# Patient Record
Sex: FEMALE | Race: WHITE | NOT HISPANIC OR LATINO
[De-identification: names, ages, dates, MRNs, and addresses within clinical notes are randomized per-mention and may not be internally consistent; named-entity substitution may affect disease eponyms.]

---

## 2018-12-14 PROBLEM — Z00.00 ENCOUNTER FOR PREVENTIVE HEALTH EXAMINATION: Status: ACTIVE | Noted: 2018-12-14

## 2019-01-14 ENCOUNTER — RECORD ABSTRACTING (OUTPATIENT)
Age: 39
End: 2019-01-14

## 2019-02-05 ENCOUNTER — APPOINTMENT (OUTPATIENT)
Dept: OBGYN | Facility: CLINIC | Age: 39
End: 2019-02-05

## 2021-01-21 ENCOUNTER — TRANSCRIPTION ENCOUNTER (OUTPATIENT)
Age: 41
End: 2021-01-21

## 2021-04-07 ENCOUNTER — APPOINTMENT (OUTPATIENT)
Dept: THORACIC SURGERY | Facility: CLINIC | Age: 41
End: 2021-04-07
Payer: COMMERCIAL

## 2021-04-07 VITALS
DIASTOLIC BLOOD PRESSURE: 94 MMHG | SYSTOLIC BLOOD PRESSURE: 142 MMHG | OXYGEN SATURATION: 99 % | HEIGHT: 65 IN | RESPIRATION RATE: 17 BRPM | HEART RATE: 70 BPM | TEMPERATURE: 98 F | BODY MASS INDEX: 27.82 KG/M2 | WEIGHT: 167 LBS

## 2021-04-07 DIAGNOSIS — Z87.891 PERSONAL HISTORY OF NICOTINE DEPENDENCE: ICD-10-CM

## 2021-04-07 PROCEDURE — 99072 ADDL SUPL MATRL&STAF TM PHE: CPT

## 2021-04-07 PROCEDURE — 99205 OFFICE O/P NEW HI 60 MIN: CPT

## 2021-04-08 PROBLEM — Z87.891 FORMER SMOKER: Status: ACTIVE | Noted: 2021-04-08

## 2021-04-08 RX ORDER — PREDNISONE 10 MG/1
10 TABLET ORAL
Refills: 0 | Status: ACTIVE | COMMUNITY

## 2021-04-08 RX ORDER — PYRIDOSTIGMINE BROMIDE 60 MG/1
60 TABLET ORAL
Refills: 0 | Status: ACTIVE | COMMUNITY

## 2021-04-09 NOTE — ASSESSMENT
[FreeTextEntry1] : Ms. RAFITA DOBBS, 40 year old female, former smoker (4 years 1/2 ppd, quit 2006), with no significant past medical history who presented with diplopia and ptosis in 12/2020 soon after diagnosed with Myasthenia Gravis; further workup demonstrated mediastinal mass on Chest CT 2/2021.\par \par I have independently reviewed the patient's medical records and diagnostic images at time of this office consultation and I recommend that she undergo a Bilateral Robotic Assisted Total Thymectomy in about a month from now when her prednisone taper is at about 15-20 mg (she is currently on 25 mg). Risks, benefits, and alternatives explained to patient including possibility of open sternotomy , all questions answered, patient agreed to proceed with surgery. She was instructed that she will require PST and COVID testing prior to surgery.\par \par I personally performed the services described in the documentation, reviewed the documentation recorded by the scribe in my presence and it accurately and completely records my words and actions.\par \par I, Jenifer Hwang NP, am scribing for and in the presence of ALEXUS Naylor, the following sections HISTORY OF PRESENT ILLNESS, PAST MEDICAL/FAMILY/SOCIAL HISTORY; REVIEW OF SYSTEMS; VITAL SIGNS; PHYSICAL EXAM; DISPOSITION.\par  \par

## 2021-04-09 NOTE — PHYSICAL EXAM
[General Appearance - Alert] : alert [General Appearance - In No Acute Distress] : in no acute distress [Sclera] : the sclera and conjunctiva were normal [PERRL With Normal Accommodation] : pupils were equal in size, round, and reactive to light [Extraocular Movements] : extraocular movements were intact [Outer Ear] : the ears and nose were normal in appearance [Oropharynx] : the oropharynx was normal [Neck Appearance] : the appearance of the neck was normal [Neck Cervical Mass (___cm)] : no neck mass was observed [Jugular Venous Distention Increased] : there was no jugular-venous distention [Thyroid Diffuse Enlargement] : the thyroid was not enlarged [Thyroid Nodule] : there were no palpable thyroid nodules [Auscultation Breath Sounds / Voice Sounds] : lungs were clear to auscultation bilaterally [Heart Rate And Rhythm] : heart rate was normal and rhythm regular [Heart Sounds] : normal S1 and S2 [Heart Sounds Gallop] : no gallops [Murmurs] : no murmurs [Heart Sounds Pericardial Friction Rub] : no pericardial rub [Examination Of The Chest] : the chest was normal in appearance [Chest Visual Inspection Thoracic Asymmetry] : no chest asymmetry [Diminished Respiratory Excursion] : normal chest expansion [2+] : left 2+ [Bowel Sounds] : normal bowel sounds [Abdomen Soft] : soft [Abdomen Tenderness] : non-tender [Abdomen Mass (___ Cm)] : no abdominal mass palpated [Cervical Lymph Nodes Enlarged Posterior Bilaterally] : posterior cervical [Cervical Lymph Nodes Enlarged Anterior Bilaterally] : anterior cervical [Supraclavicular Lymph Nodes Enlarged Bilaterally] : supraclavicular [No CVA Tenderness] : no ~M costovertebral angle tenderness [No Spinal Tenderness] : no spinal tenderness [Abnormal Walk] : normal gait [Nail Clubbing] : no clubbing  or cyanosis of the fingernails [Musculoskeletal - Swelling] : no joint swelling seen [Motor Tone] : muscle strength and tone were normal [Skin Color & Pigmentation] : normal skin color and pigmentation [Skin Turgor] : normal skin turgor [] : no rash [Sensation] : the sensory exam was normal to light touch and pinprick [No Focal Deficits] : no focal deficits [Oriented To Time, Place, And Person] : oriented to person, place, and time [Affect] : the affect was normal [FreeTextEntry1] : Deferred

## 2021-04-09 NOTE — REVIEW OF SYSTEMS
[As Noted in HPI] : as noted in HPI [Limb Weakness] : limb weakness [Negative] : Heme/Lymph [FreeTextEntry3] : Eye fluttering

## 2021-04-09 NOTE — DATA REVIEWED
[FreeTextEntry1] : I have independently reviewed the CT Chest on 2/5/2021 ([facility]):\par - 2.4 x 3.5 x 3.5 cm Left sided paracardiac mass anterolateral to the main pulmonary trunk (2:57) \par - Calcified left subcarinal lymph node\par - LLL calcified granuloma\par

## 2021-04-09 NOTE — CONSULT LETTER
[Dear  ___] : Dear  [unfilled], [Consult Letter:] : I had the pleasure of evaluating your patient, [unfilled]. [( Thank you for referring [unfilled] for consultation for _____ )] : Thank you for referring [unfilled] for consultation for [unfilled] [Please see my note below.] : Please see my note below. [Consult Closing:] : Thank you very much for allowing me to participate in the care of this patient.  If you have any questions, please do not hesitate to contact me. [Sincerely,] : Sincerely, [DrValentina  ___] : Dr. HICKS [FreeTextEntry2] : Dr. Michael Alvarado (Neuro) [FreeTextEntry3] : Reza Callahan MD, FACS \par , Division of Thoracic Surgery \par [facility] \par Chief, Thoracic Surgery \par [facility] \par Department of Cardiovascular & Thoracic Surgery \par  \par [facility] [employer] [facility]\par

## 2021-05-07 ENCOUNTER — OUTPATIENT (OUTPATIENT)
Dept: OUTPATIENT SERVICES | Facility: HOSPITAL | Age: 41
LOS: 1 days | End: 2021-05-07

## 2021-05-07 VITALS
RESPIRATION RATE: 16 BRPM | DIASTOLIC BLOOD PRESSURE: 84 MMHG | HEART RATE: 67 BPM | OXYGEN SATURATION: 98 % | SYSTOLIC BLOOD PRESSURE: 130 MMHG | WEIGHT: 162.92 LBS | HEIGHT: 64.75 IN

## 2021-05-07 DIAGNOSIS — Z90.89 ACQUIRED ABSENCE OF OTHER ORGANS: Chronic | ICD-10-CM

## 2021-05-07 DIAGNOSIS — Z98.890 OTHER SPECIFIED POSTPROCEDURAL STATES: Chronic | ICD-10-CM

## 2021-05-07 DIAGNOSIS — J98.59 OTHER DISEASES OF MEDIASTINUM, NOT ELSEWHERE CLASSIFIED: ICD-10-CM

## 2021-05-07 LAB
ANION GAP SERPL CALC-SCNC: 12 MMOL/L — SIGNIFICANT CHANGE UP (ref 7–14)
BLD GP AB SCN SERPL QL: NEGATIVE — SIGNIFICANT CHANGE UP
BUN SERPL-MCNC: 15 MG/DL — SIGNIFICANT CHANGE UP (ref 7–23)
CALCIUM SERPL-MCNC: 9 MG/DL — SIGNIFICANT CHANGE UP (ref 8.4–10.5)
CHLORIDE SERPL-SCNC: 103 MMOL/L — SIGNIFICANT CHANGE UP (ref 98–107)
CO2 SERPL-SCNC: 24 MMOL/L — SIGNIFICANT CHANGE UP (ref 22–31)
CREAT SERPL-MCNC: 0.67 MG/DL — SIGNIFICANT CHANGE UP (ref 0.5–1.3)
GLUCOSE SERPL-MCNC: 80 MG/DL — SIGNIFICANT CHANGE UP (ref 70–99)
HCG UR QL: NEGATIVE — SIGNIFICANT CHANGE UP
HCT VFR BLD CALC: 41.3 % — SIGNIFICANT CHANGE UP (ref 34.5–45)
HGB BLD-MCNC: 13.6 G/DL — SIGNIFICANT CHANGE UP (ref 11.5–15.5)
MCHC RBC-ENTMCNC: 30.1 PG — SIGNIFICANT CHANGE UP (ref 27–34)
MCHC RBC-ENTMCNC: 32.9 GM/DL — SIGNIFICANT CHANGE UP (ref 32–36)
MCV RBC AUTO: 91.4 FL — SIGNIFICANT CHANGE UP (ref 80–100)
NRBC # BLD: 0 /100 WBCS — SIGNIFICANT CHANGE UP
NRBC # FLD: 0 K/UL — SIGNIFICANT CHANGE UP
PLATELET # BLD AUTO: 285 K/UL — SIGNIFICANT CHANGE UP (ref 150–400)
POTASSIUM SERPL-MCNC: 3.5 MMOL/L — SIGNIFICANT CHANGE UP (ref 3.5–5.3)
POTASSIUM SERPL-SCNC: 3.5 MMOL/L — SIGNIFICANT CHANGE UP (ref 3.5–5.3)
RBC # BLD: 4.52 M/UL — SIGNIFICANT CHANGE UP (ref 3.8–5.2)
RBC # FLD: 13.3 % — SIGNIFICANT CHANGE UP (ref 10.3–14.5)
RH IG SCN BLD-IMP: NEGATIVE — SIGNIFICANT CHANGE UP
SODIUM SERPL-SCNC: 139 MMOL/L — SIGNIFICANT CHANGE UP (ref 135–145)
WBC # BLD: 11.27 K/UL — HIGH (ref 3.8–10.5)
WBC # FLD AUTO: 11.27 K/UL — HIGH (ref 3.8–10.5)

## 2021-05-07 RX ORDER — SODIUM CHLORIDE 9 MG/ML
1000 INJECTION, SOLUTION INTRAVENOUS
Refills: 0 | Status: DISCONTINUED | OUTPATIENT
Start: 2021-05-25 | End: 2021-05-26

## 2021-05-07 NOTE — H&P PST ADULT - NSICDXPASTMEDICALHX_GEN_ALL_CORE_FT
PAST MEDICAL HISTORY:  Disorder of knee left knee - surgeries x3 with 2 screws    Mediastinal mass     Myasthenia gravis     Smoking history

## 2021-05-07 NOTE — H&P PST ADULT - HISTORY OF PRESENT ILLNESS
Pt had Pfizer COVID vaccine   Pt to have COVID preop test   Pt denies COVID  Pt is a 41 yr old female scheduled for Robotic Assisted B/L Total Thymectomy with Dr Callahan tentatively 5/25/21 - pt noted diplopia and ptosis 12/20 and Dx Myasthenia Gravis and found to have mediastinal mass - now for removal. Pt reports improvement with vision - on prednisone and denies weakness or SOB     Pt had Pfizer COVID vaccine   Pt to have COVID preop test   Pt denies COVID

## 2021-05-07 NOTE — H&P PST ADULT - NSICDXPASTSURGICALHX_GEN_ALL_CORE_FT
PAST SURGICAL HISTORY:  H/O bilateral breast reduction surgery     H/O knee surgery left - x3 with screws    History of tonsillectomy

## 2021-05-07 NOTE — H&P PST ADULT - NSICDXPROBLEM_GEN_ALL_CORE_FT
PROBLEM DIAGNOSES  Problem: Mediastinal mass  Assessment and Plan: Pt scheduled for surgery and preop instructions including instructions for taking Famotidine and for Chlorhexidine use in showering on the day of surgery, given verbally and with use of  written materials, and patient confirming understanding of such instructions using  teach back   method.  OR Booking notified of Left knee screws and copper IUD  Pt to take Mestinon and Prednisone am DOS

## 2021-05-07 NOTE — H&P PST ADULT - NSANTHOSAYNRD_GEN_A_CORE
No. MAMIE screening performed.  STOP BANG Legend: 0-2 = LOW Risk; 3-4 = INTERMEDIATE Risk; 5-8 = HIGH Risk

## 2021-05-22 ENCOUNTER — APPOINTMENT (OUTPATIENT)
Dept: DISASTER EMERGENCY | Facility: CLINIC | Age: 41
End: 2021-05-22

## 2021-05-22 DIAGNOSIS — Z01.818 ENCOUNTER FOR OTHER PREPROCEDURAL EXAMINATION: ICD-10-CM

## 2021-05-23 LAB — SARS-COV-2 N GENE NPH QL NAA+PROBE: NOT DETECTED

## 2021-05-24 NOTE — ASU PATIENT PROFILE, ADULT - PSH
H/O bilateral breast reduction surgery    H/O knee surgery  left - x3 with screws  History of tonsillectomy

## 2021-05-25 ENCOUNTER — APPOINTMENT (OUTPATIENT)
Dept: THORACIC SURGERY | Facility: HOSPITAL | Age: 41
End: 2021-05-25

## 2021-05-25 ENCOUNTER — RESULT REVIEW (OUTPATIENT)
Age: 41
End: 2021-05-25

## 2021-05-25 ENCOUNTER — INPATIENT (INPATIENT)
Facility: HOSPITAL | Age: 41
LOS: 0 days | Discharge: HOME | End: 2021-05-26
Attending: THORACIC SURGERY (CARDIOTHORACIC VASCULAR SURGERY) | Admitting: THORACIC SURGERY (CARDIOTHORACIC VASCULAR SURGERY)
Payer: COMMERCIAL

## 2021-05-25 VITALS
HEIGHT: 64.75 IN | OXYGEN SATURATION: 100 % | DIASTOLIC BLOOD PRESSURE: 92 MMHG | TEMPERATURE: 99 F | SYSTOLIC BLOOD PRESSURE: 137 MMHG | HEART RATE: 105 BPM | WEIGHT: 162.92 LBS | RESPIRATION RATE: 16 BRPM

## 2021-05-25 DIAGNOSIS — Z98.890 OTHER SPECIFIED POSTPROCEDURAL STATES: Chronic | ICD-10-CM

## 2021-05-25 DIAGNOSIS — J98.59 OTHER DISEASES OF MEDIASTINUM, NOT ELSEWHERE CLASSIFIED: ICD-10-CM

## 2021-05-25 DIAGNOSIS — Z90.89 ACQUIRED ABSENCE OF OTHER ORGANS: Chronic | ICD-10-CM

## 2021-05-25 LAB — HCG UR QL: NEGATIVE — SIGNIFICANT CHANGE UP

## 2021-05-25 PROCEDURE — 99233 SBSQ HOSP IP/OBS HIGH 50: CPT

## 2021-05-25 PROCEDURE — 71045 X-RAY EXAM CHEST 1 VIEW: CPT | Mod: 26

## 2021-05-25 PROCEDURE — 32662 THORACOSCOPY W/MEDIAST EXC: CPT

## 2021-05-25 PROCEDURE — 88307 TISSUE EXAM BY PATHOLOGIST: CPT | Mod: 26

## 2021-05-25 RX ORDER — HEPARIN SODIUM 5000 [USP'U]/ML
5000 INJECTION INTRAVENOUS; SUBCUTANEOUS ONCE
Refills: 0 | Status: COMPLETED | OUTPATIENT
Start: 2021-05-25 | End: 2021-05-25

## 2021-05-25 RX ORDER — NALOXONE HYDROCHLORIDE 4 MG/.1ML
0.1 SPRAY NASAL
Refills: 0 | Status: DISCONTINUED | OUTPATIENT
Start: 2021-05-25 | End: 2021-05-26

## 2021-05-25 RX ORDER — GABAPENTIN 400 MG/1
300 CAPSULE ORAL ONCE
Refills: 0 | Status: COMPLETED | OUTPATIENT
Start: 2021-05-25 | End: 2021-05-25

## 2021-05-25 RX ORDER — PYRIDOSTIGMINE BROMIDE 60 MG/5ML
1 SOLUTION ORAL
Qty: 0 | Refills: 0 | DISCHARGE

## 2021-05-25 RX ORDER — ACETAMINOPHEN 500 MG
1000 TABLET ORAL ONCE
Refills: 0 | Status: DISCONTINUED | OUTPATIENT
Start: 2021-05-25 | End: 2021-05-26

## 2021-05-25 RX ORDER — HYDROMORPHONE HYDROCHLORIDE 2 MG/ML
0.5 INJECTION INTRAMUSCULAR; INTRAVENOUS; SUBCUTANEOUS
Refills: 0 | Status: DISCONTINUED | OUTPATIENT
Start: 2021-05-25 | End: 2021-05-26

## 2021-05-25 RX ORDER — FAMOTIDINE 10 MG/ML
20 INJECTION INTRAVENOUS
Refills: 0 | Status: DISCONTINUED | OUTPATIENT
Start: 2021-05-25 | End: 2021-05-26

## 2021-05-25 RX ORDER — ACETAMINOPHEN 500 MG
975 TABLET ORAL ONCE
Refills: 0 | Status: COMPLETED | OUTPATIENT
Start: 2021-05-25 | End: 2021-05-25

## 2021-05-25 RX ORDER — PYRIDOSTIGMINE BROMIDE 60 MG/5ML
60 SOLUTION ORAL THREE TIMES A DAY
Refills: 0 | Status: DISCONTINUED | OUTPATIENT
Start: 2021-05-25 | End: 2021-05-26

## 2021-05-25 RX ORDER — SODIUM CHLORIDE 9 MG/ML
4 INJECTION INTRAMUSCULAR; INTRAVENOUS; SUBCUTANEOUS EVERY 8 HOURS
Refills: 0 | Status: DISCONTINUED | OUTPATIENT
Start: 2021-05-25 | End: 2021-05-25

## 2021-05-25 RX ORDER — HEPARIN SODIUM 5000 [USP'U]/ML
5000 INJECTION INTRAVENOUS; SUBCUTANEOUS EVERY 8 HOURS
Refills: 0 | Status: DISCONTINUED | OUTPATIENT
Start: 2021-05-25 | End: 2021-05-26

## 2021-05-25 RX ORDER — HYDROMORPHONE HYDROCHLORIDE 2 MG/ML
30 INJECTION INTRAMUSCULAR; INTRAVENOUS; SUBCUTANEOUS
Refills: 0 | Status: DISCONTINUED | OUTPATIENT
Start: 2021-05-25 | End: 2021-05-26

## 2021-05-25 RX ORDER — ACETAMINOPHEN 500 MG
1000 TABLET ORAL ONCE
Refills: 0 | Status: DISCONTINUED | OUTPATIENT
Start: 2021-05-26 | End: 2021-05-26

## 2021-05-25 RX ORDER — METOPROLOL TARTRATE 50 MG
12.5 TABLET ORAL EVERY 8 HOURS
Refills: 0 | Status: DISCONTINUED | OUTPATIENT
Start: 2021-05-25 | End: 2021-05-25

## 2021-05-25 RX ORDER — DIPHENHYDRAMINE HCL 50 MG
25 CAPSULE ORAL EVERY 4 HOURS
Refills: 0 | Status: DISCONTINUED | OUTPATIENT
Start: 2021-05-25 | End: 2021-05-26

## 2021-05-25 RX ORDER — ONDANSETRON 8 MG/1
4 TABLET, FILM COATED ORAL EVERY 6 HOURS
Refills: 0 | Status: DISCONTINUED | OUTPATIENT
Start: 2021-05-25 | End: 2021-05-26

## 2021-05-25 RX ADMIN — Medication 975 MILLIGRAM(S): at 07:39

## 2021-05-25 RX ADMIN — HYDROMORPHONE HYDROCHLORIDE 30 MILLILITER(S): 2 INJECTION INTRAMUSCULAR; INTRAVENOUS; SUBCUTANEOUS at 21:04

## 2021-05-25 RX ADMIN — PYRIDOSTIGMINE BROMIDE 60 MILLIGRAM(S): 60 SOLUTION ORAL at 14:00

## 2021-05-25 RX ADMIN — PYRIDOSTIGMINE BROMIDE 60 MILLIGRAM(S): 60 SOLUTION ORAL at 22:59

## 2021-05-25 RX ADMIN — Medication 5 MILLIGRAM(S): at 22:59

## 2021-05-25 RX ADMIN — HYDROMORPHONE HYDROCHLORIDE 30 MILLILITER(S): 2 INJECTION INTRAMUSCULAR; INTRAVENOUS; SUBCUTANEOUS at 17:15

## 2021-05-25 RX ADMIN — HEPARIN SODIUM 5000 UNIT(S): 5000 INJECTION INTRAVENOUS; SUBCUTANEOUS at 22:59

## 2021-05-25 RX ADMIN — SODIUM CHLORIDE 30 MILLILITER(S): 9 INJECTION, SOLUTION INTRAVENOUS at 07:38

## 2021-05-25 RX ADMIN — SODIUM CHLORIDE 30 MILLILITER(S): 9 INJECTION, SOLUTION INTRAVENOUS at 21:10

## 2021-05-25 RX ADMIN — HEPARIN SODIUM 5000 UNIT(S): 5000 INJECTION INTRAVENOUS; SUBCUTANEOUS at 07:39

## 2021-05-25 RX ADMIN — GABAPENTIN 300 MILLIGRAM(S): 400 CAPSULE ORAL at 07:39

## 2021-05-25 RX ADMIN — Medication 5 MILLIGRAM(S): at 14:00

## 2021-05-25 RX ADMIN — HEPARIN SODIUM 5000 UNIT(S): 5000 INJECTION INTRAVENOUS; SUBCUTANEOUS at 14:00

## 2021-05-25 RX ADMIN — FAMOTIDINE 20 MILLIGRAM(S): 10 INJECTION INTRAVENOUS at 17:14

## 2021-05-25 NOTE — BRIEF OPERATIVE NOTE - BRIEF OP NOTE DRAINS
Left Harry drain that crosses midline and into Right pleural space to drain Bilateral pleural spaces

## 2021-05-25 NOTE — BRIEF OPERATIVE NOTE - NSICDXBRIEFPROCEDURE_GEN_ALL_CORE_FT
PROCEDURES:  Thoracoscopic robotic assisted procedure 25-May-2021 12:29:24 Robotic LVATS Radical thymectomy Aram Persaud

## 2021-05-25 NOTE — CHART NOTE - NSCHARTNOTEFT_GEN_A_CORE
Initial consult for MG and thymoma performed in office with discussion of resection of thymus and mass via bilateral video thoracoscopy with robot assistance. Consent obtained today by me for right sided approach. In OR I decided to approach initially from the left with option to go to the right which was also prepped in the field. In OR before proceeding with time out and surgery I personally  amended consent to read left/ or right thoracoscopy robotic assisted with possible  thoracotomy  for resection of mass and thymus. We were able to complete a total thymectomy through the left sided approach.

## 2021-05-26 ENCOUNTER — TRANSCRIPTION ENCOUNTER (OUTPATIENT)
Age: 41
End: 2021-05-26

## 2021-05-26 VITALS
DIASTOLIC BLOOD PRESSURE: 75 MMHG | OXYGEN SATURATION: 98 % | HEART RATE: 92 BPM | SYSTOLIC BLOOD PRESSURE: 123 MMHG | RESPIRATION RATE: 19 BRPM

## 2021-05-26 LAB
ANION GAP SERPL CALC-SCNC: 12 MMOL/L — SIGNIFICANT CHANGE UP (ref 7–14)
BUN SERPL-MCNC: 12 MG/DL — SIGNIFICANT CHANGE UP (ref 7–23)
CALCIUM SERPL-MCNC: 8.7 MG/DL — SIGNIFICANT CHANGE UP (ref 8.4–10.5)
CHLORIDE SERPL-SCNC: 105 MMOL/L — SIGNIFICANT CHANGE UP (ref 98–107)
CO2 SERPL-SCNC: 22 MMOL/L — SIGNIFICANT CHANGE UP (ref 22–31)
CREAT SERPL-MCNC: 0.67 MG/DL — SIGNIFICANT CHANGE UP (ref 0.5–1.3)
GLUCOSE SERPL-MCNC: 126 MG/DL — HIGH (ref 70–99)
MAGNESIUM SERPL-MCNC: 2.1 MG/DL — SIGNIFICANT CHANGE UP (ref 1.6–2.6)
PHOSPHATE SERPL-MCNC: 3 MG/DL — SIGNIFICANT CHANGE UP (ref 2.5–4.5)
POTASSIUM SERPL-MCNC: 3.7 MMOL/L — SIGNIFICANT CHANGE UP (ref 3.5–5.3)
POTASSIUM SERPL-SCNC: 3.7 MMOL/L — SIGNIFICANT CHANGE UP (ref 3.5–5.3)
SODIUM SERPL-SCNC: 139 MMOL/L — SIGNIFICANT CHANGE UP (ref 135–145)

## 2021-05-26 PROCEDURE — 71045 X-RAY EXAM CHEST 1 VIEW: CPT | Mod: 26

## 2021-05-26 PROCEDURE — 71045 X-RAY EXAM CHEST 1 VIEW: CPT | Mod: 26,77,76

## 2021-05-26 PROCEDURE — 99232 SBSQ HOSP IP/OBS MODERATE 35: CPT

## 2021-05-26 RX ORDER — OXYCODONE HYDROCHLORIDE 5 MG/1
1 TABLET ORAL
Qty: 30 | Refills: 0
Start: 2021-05-26 | End: 2021-05-30

## 2021-05-26 RX ORDER — ACETAMINOPHEN 500 MG
650 TABLET ORAL EVERY 6 HOURS
Refills: 0 | Status: DISCONTINUED | OUTPATIENT
Start: 2021-05-26 | End: 2021-05-26

## 2021-05-26 RX ORDER — OXYCODONE HYDROCHLORIDE 5 MG/1
5 TABLET ORAL
Refills: 0 | Status: DISCONTINUED | OUTPATIENT
Start: 2021-05-26 | End: 2021-05-26

## 2021-05-26 RX ORDER — OXYCODONE HYDROCHLORIDE 5 MG/1
10 TABLET ORAL
Refills: 0 | Status: DISCONTINUED | OUTPATIENT
Start: 2021-05-26 | End: 2021-05-26

## 2021-05-26 RX ORDER — SENNA PLUS 8.6 MG/1
2 TABLET ORAL
Qty: 14 | Refills: 0
Start: 2021-05-26 | End: 2021-06-01

## 2021-05-26 RX ORDER — POTASSIUM CHLORIDE 20 MEQ
40 PACKET (EA) ORAL ONCE
Refills: 0 | Status: COMPLETED | OUTPATIENT
Start: 2021-05-26 | End: 2021-05-26

## 2021-05-26 RX ADMIN — FAMOTIDINE 20 MILLIGRAM(S): 10 INJECTION INTRAVENOUS at 05:04

## 2021-05-26 RX ADMIN — PYRIDOSTIGMINE BROMIDE 60 MILLIGRAM(S): 60 SOLUTION ORAL at 05:04

## 2021-05-26 RX ADMIN — HEPARIN SODIUM 5000 UNIT(S): 5000 INJECTION INTRAVENOUS; SUBCUTANEOUS at 14:23

## 2021-05-26 RX ADMIN — Medication 5 MILLIGRAM(S): at 05:04

## 2021-05-26 RX ADMIN — Medication 650 MILLIGRAM(S): at 14:23

## 2021-05-26 RX ADMIN — PYRIDOSTIGMINE BROMIDE 60 MILLIGRAM(S): 60 SOLUTION ORAL at 14:23

## 2021-05-26 RX ADMIN — HYDROMORPHONE HYDROCHLORIDE 30 MILLILITER(S): 2 INJECTION INTRAMUSCULAR; INTRAVENOUS; SUBCUTANEOUS at 07:01

## 2021-05-26 RX ADMIN — Medication 40 MILLIEQUIVALENT(S): at 12:14

## 2021-05-26 RX ADMIN — SODIUM CHLORIDE 30 MILLILITER(S): 9 INJECTION, SOLUTION INTRAVENOUS at 07:02

## 2021-05-26 RX ADMIN — HEPARIN SODIUM 5000 UNIT(S): 5000 INJECTION INTRAVENOUS; SUBCUTANEOUS at 05:04

## 2021-05-26 RX ADMIN — Medication 5 MILLIGRAM(S): at 14:23

## 2021-05-26 NOTE — DISCHARGE NOTE PROVIDER - NSDCCPCAREPLAN_GEN_ALL_CORE_FT
PRINCIPAL DISCHARGE DIAGNOSIS  Diagnosis: Mediastinal mass  Assessment and Plan of Treatment:

## 2021-05-26 NOTE — DISCHARGE NOTE PROVIDER - NSDCMRMEDTOKEN_GEN_ALL_CORE_FT
Mestinon 60 mg oral tablet: 1 tab(s) orally 3 times a day  oxyCODONE 5 mg oral tablet: 1 tab(s) orally every 4 to 6 hours, As Needed -for moderate pain MDD:6 tabs   predniSONE 5 mg oral tablet: 1 tab(s) orally TID a day  senna oral tablet: 2 tab(s) orally once a day (at bedtime) MDD:2 tabs

## 2021-05-26 NOTE — DISCHARGE NOTE NURSING/CASE MANAGEMENT/SOCIAL WORK - PATIENT PORTAL LINK FT
You can access the FollowMyHealth Patient Portal offered by [facility] by registering at the following website: [email or web address]. By joining [facility]’s FollowMyHealth portal, you will also be able to view your health information using other applications (apps) compatible with our system.

## 2021-05-26 NOTE — DISCHARGE NOTE PROVIDER - HOSPITAL COURSE
40 y/o female w/ Myasthenia Gravis and a mediastinal mass.  Admitted through Same-Day Surgery on 5/25/21, and underwent Robotic-Assisted Left VATS, Radical Thymectomy.  Uneventful postoperative course.  +Small bilateral apical pneumothorax today after chest tube removal.  Patient denies any chest pains or SOB, and is hemodynamically stable.  She was seen at bedside earlier this morning with Dr. Callahan, and plan is for discharge home today, pending repeat CXR this afternoon. 42 y/o female w/ Myasthenia Gravis and a mediastinal mass.  Admitted through Same-Day Surgery on 5/25/21, and underwent Robotic-Assisted Left VATS, Radical Thymectomy.  Uneventful postoperative course.  Patient denies any chest pains or SOB, and is hemodynamically stable.  +Small bilateral apical pneumothorax today after chest tube removal.  Repeat CXR 4hrs later reviewed with radiology, and bilateral pneumothorax stable, unchanged (discussed with Dr. Callahan and Dr. French).  Patient to be discharged home today.      Vital Signs:  Vital Signs Last 24 Hrs  T(C): 36.4 (05-26-21 @ 08:00), Max: 37.4 (05-25-21 @ 20:00)  T(F): 97.5 (05-26-21 @ 08:00), Max: 99.3 (05-25-21 @ 20:00)  HR: 101 (05-26-21 @ 13:00) (60 - 101)  BP: 124/72 (05-26-21 @ 13:00) (94/58 - 129/74)  RR: 27 (05-26-21 @ 13:00) (12 - 27)  SpO2: 99% (05-26-21 @ 13:00) (97% - 100%) on (O2)      General: Awake, A&Ox3, NAD, comfortable on room-air  Eyes: Scleras clear, PERRLA/ EOMI, Gross vision intact  ENT: Gross hearing intact, grossly patent pharynx, no stridor  Neck: Neck supple, trachea midline, No JVD  Respiratory: CTA B/L  CV: S1S2; no audible murmurs  Abdominal: +BS's x4, soft, ND/NT  Extremities: No edema, + peripheral pulses  Skin: No Rashes, Hematoma, Ecchymosis  Incisions: Left chest incisions C/D/I

## 2021-05-26 NOTE — DISCHARGE NOTE PROVIDER - CARE PROVIDER_API CALL
Reza Callahan)  Thoracic Surgery  [phone] [place] [address]  Phone: [phone]  Fax: [phone]  Follow Up Time: 2 weeks

## 2021-05-26 NOTE — DISCHARGE NOTE NURSING/CASE MANAGEMENT/SOCIAL WORK - NSDCPNINST_GEN_ALL_CORE
Take medications as prescribed. Follow up with your doctor as directed. Shower daily with mild soap & water, pat sites dry. No wash cloth on incisions. No cream, lotion or oils on wounds. Increase activity slowly, as tolerated. Continue to use your spirometer and perform  your deep breathing & coughing exercises. No driving while on pain medication, no heavy lifting . Call your doctor for fever over 101 degrees, pain not relieved by pain medication or for any questions or concerns you may have. Remember to have a chest  x ray before you see the doctor & bring a copy of films with you to your appointment.  Please call [phone] for chest pain, shortness of breath or for signs & symptoms of stroke.

## 2021-05-26 NOTE — DISCHARGE NOTE NURSING/CASE MANAGEMENT/SOCIAL WORK - NSDCFUADDAPPT_GEN_ALL_CORE_FT
Follow-up with Dr. Callahan in 2 weeks [phone]  Left chest suture to be removed at follow-up appointment with Dr. Callahan    Follow-up with PMD within 2 weeks

## 2021-05-26 NOTE — DISCHARGE NOTE PROVIDER - NSDCFUADDINST_GEN_ALL_CORE_FT
Ambulate at least 4-5x daily and continue incentive spirometry frequently.  Call Dr. Callahan's office [phone] if you have any increased shortness of breath, worsening chest pains, fever of 101oF, or any other concerning symptom at all, or CALL [phone].

## 2021-05-26 NOTE — DISCHARGE NOTE PROVIDER - NSDCFUADDAPPT_GEN_ALL_CORE_FT
Follow-up with Dr. Callahan in 2 weeks [phone]  Left chest suture to be removed at follow-up appointment with Dr. Callahan    Follow-up with PMD within 2 weeks

## 2021-05-27 PROBLEM — Z87.891 PERSONAL HISTORY OF NICOTINE DEPENDENCE: Chronic | Status: ACTIVE | Noted: 2021-05-07

## 2021-05-27 PROBLEM — M25.9 JOINT DISORDER, UNSPECIFIED: Chronic | Status: ACTIVE | Noted: 2021-05-07

## 2021-05-27 PROBLEM — G70.00 MYASTHENIA GRAVIS WITHOUT (ACUTE) EXACERBATION: Chronic | Status: ACTIVE | Noted: 2021-05-07

## 2021-05-27 PROBLEM — J98.59 OTHER DISEASES OF MEDIASTINUM, NOT ELSEWHERE CLASSIFIED: Chronic | Status: ACTIVE | Noted: 2021-05-07

## 2021-06-02 LAB — SURGICAL PATHOLOGY STUDY: SIGNIFICANT CHANGE UP

## 2021-06-09 ENCOUNTER — NON-APPOINTMENT (OUTPATIENT)
Age: 41
End: 2021-06-09

## 2021-06-09 ENCOUNTER — APPOINTMENT (OUTPATIENT)
Dept: THORACIC SURGERY | Facility: CLINIC | Age: 41
End: 2021-06-09
Payer: COMMERCIAL

## 2021-06-09 VITALS
HEIGHT: 65 IN | BODY MASS INDEX: 27.49 KG/M2 | WEIGHT: 165 LBS | HEART RATE: 77 BPM | RESPIRATION RATE: 17 BRPM | SYSTOLIC BLOOD PRESSURE: 107 MMHG | OXYGEN SATURATION: 97 % | DIASTOLIC BLOOD PRESSURE: 71 MMHG | TEMPERATURE: 98.6 F

## 2021-06-09 DIAGNOSIS — J98.59 OTHER DISEASES OF MEDIASTINUM, NOT ELSEWHERE CLASSIFIED: ICD-10-CM

## 2021-06-09 PROCEDURE — 99024 POSTOP FOLLOW-UP VISIT: CPT

## 2021-06-09 RX ORDER — ATROPINE SULFATE 10 MG/ML
1 SOLUTION OPHTHALMIC
Qty: 5 | Refills: 0 | Status: DISCONTINUED | COMMUNITY
Start: 2021-02-08 | End: 2021-06-09

## 2021-09-01 ENCOUNTER — APPOINTMENT (OUTPATIENT)
Dept: NEUROLOGY | Facility: CLINIC | Age: 41
End: 2021-09-01
Payer: COMMERCIAL

## 2021-09-01 ENCOUNTER — NON-APPOINTMENT (OUTPATIENT)
Age: 41
End: 2021-09-01

## 2021-09-01 VITALS
HEART RATE: 76 BPM | WEIGHT: 165 LBS | HEIGHT: 65 IN | BODY MASS INDEX: 27.49 KG/M2 | SYSTOLIC BLOOD PRESSURE: 139 MMHG | DIASTOLIC BLOOD PRESSURE: 87 MMHG

## 2021-09-01 PROCEDURE — 99205 OFFICE O/P NEW HI 60 MIN: CPT

## 2021-09-01 RX ORDER — PYRIDOSTIGMINE BROMIDE 60 MG/1
60 TABLET ORAL
Qty: 120 | Refills: 5 | Status: ACTIVE | COMMUNITY
Start: 2021-09-01 | End: 1900-01-01

## 2021-09-01 NOTE — HISTORY OF PRESENT ILLNESS
[FreeTextEntry1] : Patient referred for evaluation of myasthenia gravis.  She was diagnosed this past February; had onset of diplopia and ptosis the prior December.  She only had eye symptoms and denies trouble swallowing or SOB or trouble with speech of limb weakness.  \par \par She had AchR ab positive:  binding 8.95, modulating 80%\par \par Subsequently had chest CT which revealed thymoma.  Had resection by Dr. Callahan May 25 at [facility].  Pathology benign with clear margins. \par \par She takes mestinon 60mg TID, prednisone 15mg QD.  No SE's from mestinon\par \par She now notes some left ptosis and no more ptosis.   She denies FH of MG.

## 2021-09-01 NOTE — PHYSICAL EXAM
[General Appearance - Alert] : alert [General Appearance - In No Acute Distress] : in no acute distress [Person] : oriented to person [Place] : oriented to place [Time] : oriented to time [Short Term Intact] : short term memory intact [Remote Intact] : remote memory intact [Registration Intact] : recent registration memory intact [Concentration Intact] : normal concentrating ability [Visual Intact] : visual attention was ~T not ~L decreased [Naming Objects] : no difficulty naming common objects [Repeating Phrases] : no difficulty repeating a phrase [Writing A Sentence] : no difficulty writing a sentence [Fluency] : fluency intact [Comprehension] : comprehension intact [Reading] : reading intact [Past History] : adequate knowledge of personal past history [Cranial Nerves Optic (II)] : visual acuity intact bilaterally,  visual fields full to confrontation, pupils equal round and reactive to light [Cranial Nerves Trigeminal (V)] : facial sensation intact symmetrically [Cranial Nerves Facial (VII)] : face symmetrical [Cranial Nerves Vestibulocochlear (VIII)] : hearing was intact bilaterally [Cranial Nerves Glossopharyngeal (IX)] : tongue and palate midline [Cranial Nerves Accessory (XI - Cranial And Spinal)] : head turning and shoulder shrug symmetric [Cranial Nerves Hypoglossal (XII)] : there was no tongue deviation with protrusion [Motor Tone] : muscle tone was normal in all four extremities [Motor Strength] : muscle strength was normal in all four extremities [No Muscle Atrophy] : normal bulk in all four extremities [Motor Handedness Right-Handed] : the patient is right hand dominant [Sensation Tactile Decrease] : light touch was intact [Sensation Vibration Decrease] : vibration was intact [Abnormal Walk] : normal gait [Balance] : balance was intact [Past-pointing] : there was no past-pointing [Tremor] : no tremor present [2+] : Ankle jerk left 2+ [Plantar Reflex Right Only] : normal on the right [Plantar Reflex Left Only] : normal on the left [FreeTextEntry5] : mild left > right ptosis [FreeTextEntry6] : MG-ADL 1 [Neck Cervical Mass (___cm)] : no neck mass was observed [Neck Appearance] : the appearance of the neck was normal [Jugular Venous Distention Increased] : there was no jugular-venous distention [Thyroid Diffuse Enlargement] : the thyroid was not enlarged [Thyroid Nodule] : there were no palpable thyroid nodules [] : no respiratory distress [Auscultation Breath Sounds / Voice Sounds] : lungs were clear to auscultation bilaterally [Heart Rate And Rhythm] : heart rate was normal and rhythm regular [Heart Sounds] : normal S1 and S2 [Heart Sounds Gallop] : no gallops [Murmurs] : no murmurs [Heart Sounds Pericardial Friction Rub] : no pericardial rub

## 2021-09-01 NOTE — ASSESSMENT
[FreeTextEntry1] : Patient has ocular MG, seropositive.  MGFA class 1.  \par \par As she has pure ocular MG and is now 3 months post-thymectomy will not be overly aggressive with treatment. \par \par Would increase mestinon to 60mg QID and reduce prednisone to 10mg QD. \par \par If sx's emerge on this regimen will add cellcept 500mg BID until effects of thymectomy are realized. \par \par f/u 4 months. \par \par

## 2021-09-08 ENCOUNTER — NON-APPOINTMENT (OUTPATIENT)
Age: 41
End: 2021-09-08

## 2021-09-08 ENCOUNTER — APPOINTMENT (OUTPATIENT)
Dept: THORACIC SURGERY | Facility: CLINIC | Age: 41
End: 2021-09-08
Payer: COMMERCIAL

## 2021-09-08 VITALS
BODY MASS INDEX: 27.49 KG/M2 | HEART RATE: 62 BPM | OXYGEN SATURATION: 99 % | SYSTOLIC BLOOD PRESSURE: 147 MMHG | TEMPERATURE: 98.2 F | HEIGHT: 65 IN | DIASTOLIC BLOOD PRESSURE: 92 MMHG | RESPIRATION RATE: 16 BRPM | WEIGHT: 165 LBS

## 2021-09-08 PROCEDURE — 99213 OFFICE O/P EST LOW 20 MIN: CPT

## 2021-09-10 NOTE — HISTORY OF PRESENT ILLNESS
[FreeTextEntry1] : Ms. RAFITA DOBBS, 41 year old female, former smoker (4 years 1/2 ppd, quit 2006), with no significant past medical history who presented with diplopia and ptosis in 12/2020 soon after diagnosed with Myasthenia Gravis; further workup demonstrated mediastinal mass on Chest CT 2/2021.\par  \par CT Chest on 2/5/2021 ([facility]):\par - 2.4 x 3.5 x 3.5 cm Left sided paracardiac mass anterolateral to the main pulmonary trunk (2:57) \par - Calcified left subcarinal lymph node\par - LLL calcified granuloma\par \par Now s/p Left video thoracoscopy, robotic assisted total thymectomy, and removal  of mediastinal mass on 5/25/2021; 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 Also advised to follow up with her neurologist regarding need for prednisone and Mestinon. Since last visit, patient started experiencing new onset ptosis to left eye, (previous history of ptosis to right) and new onset tingling to left cheek. Established care with Dr. Griffin. Prednisone dose decreased and Mestinon dose increased. \par \par patient denies worsening SOB, chest pain, cough, hemoptysis, fever, chills, night sweats, lightheadedness or dizziness; No issues with ambulation or repetitive movement. No dysphagia\par

## 2021-09-10 NOTE — ASSESSMENT
[FreeTextEntry1] : Ms. 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 Recommendation for CT scan, no contrast, January, 2022 to evaluate post op stability. Follow up with Dr. Griffin as per his recommendation.\par \par I personally performed the services described in the documentation, reviewed the documentation recorded by the scribe in my presence and it accurately and completely records my words and actions.\par \par I, VANDANA Balbuena-C, am scribing for and the presence of ALEXUS Naylor, the following sections HISTORY OF PRESENT ILLNESS, PAST MEDICAL/FAMILY/SOCIAL HISTORY; REVIEW OF SYSTEMS; VITAL SIGNS; PHYSICAL EXAM; DISPOSITION.\par \par \par

## 2021-09-10 NOTE — CONSULT LETTER
[FreeTextEntry2] : Dr. Michael Alvarado (Neuro/ref)  [FreeTextEntry3] : Reza Callahan MD, FACS \par , Division of Thoracic Surgery \par [facility] \par Chief, Thoracic Surgery \par [facility] \par Department of Cardiovascular & Thoracic Surgery \par  \par [facility] [employer] [facility]\par

## 2021-09-10 NOTE — PHYSICAL EXAM
[Sclera] : the sclera and conjunctiva were normal [Neck Appearance] : the appearance of the neck was normal [] : no respiratory distress [Respiration, Rhythm And Depth] : normal respiratory rhythm and effort [Exaggerated Use Of Accessory Muscles For Inspiration] : no accessory muscle use [Auscultation Breath Sounds / Voice Sounds] : lungs were clear to auscultation bilaterally [Examination Of The Chest] : the chest was normal in appearance [Chest Visual Inspection Thoracic Asymmetry] : no chest asymmetry [Diminished Respiratory Excursion] : normal chest expansion [Nail Clubbing] : no clubbing  or cyanosis of the fingernails [Abnormal Walk] : normal gait [Involuntary Movements] : no involuntary movements were seen [Musculoskeletal - Swelling] : no joint swelling seen [Motor Tone] : muscle strength and tone were normal [Oriented To Time, Place, And Person] : oriented to person, place, and time [Impaired Insight] : insight and judgment were intact [Affect] : the affect was normal [Mood] : the mood was normal [Memory Recent] : recent memory was not impaired [Memory Remote] : remote memory was not impaired [FreeTextEntry1] : No ptosis observed

## 2021-09-15 ENCOUNTER — EMERGENCY (EMERGENCY)
Facility: HOSPITAL | Age: 41
LOS: 1 days | End: 2021-09-15
Payer: COMMERCIAL

## 2021-09-15 DIAGNOSIS — Z90.89 ACQUIRED ABSENCE OF OTHER ORGANS: Chronic | ICD-10-CM

## 2021-09-15 DIAGNOSIS — Z98.890 OTHER SPECIFIED POSTPROCEDURAL STATES: Chronic | ICD-10-CM

## 2021-09-15 PROCEDURE — 99284 EMERGENCY DEPT VISIT MOD MDM: CPT

## 2021-09-15 PROCEDURE — 73030 X-RAY EXAM OF SHOULDER: CPT | Mod: 26,RT

## 2021-09-15 PROCEDURE — 72125 CT NECK SPINE W/O DYE: CPT | Mod: 26

## 2021-09-20 ENCOUNTER — APPOINTMENT (OUTPATIENT)
Dept: NEUROSURGERY | Facility: CLINIC | Age: 41
End: 2021-09-20
Payer: COMMERCIAL

## 2021-09-20 VITALS
DIASTOLIC BLOOD PRESSURE: 90 MMHG | TEMPERATURE: 97.8 F | HEIGHT: 65 IN | HEART RATE: 81 BPM | BODY MASS INDEX: 26.99 KG/M2 | WEIGHT: 162 LBS | SYSTOLIC BLOOD PRESSURE: 170 MMHG

## 2021-09-20 PROCEDURE — 99204 OFFICE O/P NEW MOD 45 MIN: CPT

## 2021-09-20 NOTE — RESULTS/DATA
[FreeTextEntry1] : \par CAT scan done at [facility] shows multilevel spondylosis from C3-6 with a right-sided disc at C3-4 and spondylotic disease with cervical straightening.  There is no MRI available for my review yet.

## 2021-09-20 NOTE — REASON FOR VISIT
[New Patient Visit] : a new patient visit [Referred By: _________] : Patient was referred by FABIOLA [FreeTextEntry1] : Cervical Radiculopathy- Right side numbness [facility] ER CT Scan

## 2021-09-20 NOTE — PLAN
[FreeTextEntry1] : \par This is a patient with cervical spondylosis and radiculopathy on the right.  CAT scan implies that she has some stenosis and herniated disks however an MRI is necessary to make a proper diagnosis.  The treatment will be likely conservative upfront consisting of a bump in her steroids and potentially pain management evaluation.  If there is more crucial stenosis then surgery can be considered.  This was all discussed with her in detail in the office today.  Would like her to follow-up with a neurology doctor given any impact on her myasthenia this condition may have.\par \par I will see her personally in the office for an imaging follow-up.

## 2021-09-20 NOTE — PHYSICAL EXAM
[Normal] : normal [Able to toe walk] : the patient was able to toe walk [Able to heel walk] : the patient was able to heel walk [Pain / Temp Decrease Sensory Level At Shoulders - Right Only] : no C5 sensory impairment [Pain / Temp Decrease - Root / Radicular Distribution] : no C6 sensory impairment [Pain / Temp Decrease Sensory Level At Hands - Right Only] : no C7 sensory impairment [4] : 4/5 Elbow Extensor (C7) [5] : 5/5 Finger Flexors (C8)

## 2021-09-20 NOTE — HISTORY OF PRESENT ILLNESS
[de-identified] : \par This is a pleasant 41-year-old here for evaluation of her cervical spine.  She has a past medical history that is pertinent for myasthenia gravis.  She had a robotic assisted thymectomy.  She still taking prednisone and Mestinon and minimally symptomatic from this point of view.  She did present to the emergency department with excruciating neck and arm pain with right-sided arm weakness which is not characteristic of myasthenia.  She had a CAT scan performed in the emergency department which showed spondylosis and herniated disks.  She has some weakness in the arm but is functional.  She is right-hand dominant.  She is here with the CAT scan results and for my evaluation.  She is on 10 mg a day of prednisone and has not been bumped up.  She was given some pain medication.

## 2021-09-28 ENCOUNTER — APPOINTMENT (OUTPATIENT)
Dept: NEUROSURGERY | Facility: CLINIC | Age: 41
End: 2021-09-28
Payer: COMMERCIAL

## 2021-09-28 DIAGNOSIS — G70.00 MYASTHENIA GRAVIS W/OUT (ACUTE) EXACERBATION: ICD-10-CM

## 2021-09-28 PROCEDURE — 99212 OFFICE O/P EST SF 10 MIN: CPT | Mod: 95

## 2021-09-28 NOTE — RESULTS/DATA
[FreeTextEntry1] : Sandhya MRI of the cervical spine shows a fairly large disc herniation at C6-7 causing compression of the lateral thecal sac and nerve root on that side.

## 2021-09-28 NOTE — REASON FOR VISIT
[Follow-Up: _____] : a [unfilled] follow-up visit [Home] : at home, [unfilled] , at the time of the visit. [Medical Office: ([facility])___] : at the medical office located in  [Verbal consent obtained from patient] : the patient, [unfilled] [FreeTextEntry1] : I did a telehealth visit with Tiffany to follow-up her MRI done at [facility] of the cervical spine.  She has a history of myasthenia gravis but also has rather significant cervical radiculopathy that become more symptomatic on the right.  MRI done at [facility] was reviewed in detail with her.  She bumped up her steroid medication to 20 mg of prednisone without much results.  She is having significant right arm numbness and pain.

## 2021-09-28 NOTE — ASSESSMENT
[FreeTextEntry1] : \par DIAGNOSIS:  CERVICAL SPONDYLOSIS cervical radiculopathy right, cervical disc herniation C6-7\par TREATMENT PLAN:  NON-SURGICAL   VS.   ACDF  C6-7\par \par This is a patient with cervical spondylosis and stenosis.  I have recommended nonsurgical management at this time.  This consists of physical therapy and/or manual medicine in conjunction to medical therapy and other conservative methods.  These include the consideration of trigger point injections and or the utilization of modalities such as TENS where applicable.  The next tier would be the referral to a pain management specialist (anesthesia or physiatry) for the consideration of spinal injections.  This includes the options of epidural steroids, facet injections as well as other novel techniques that may provide pain relief.  Although there is indeed evidence of disk degeneration on imaging, discectomy or spinal fusion for the sole purposes of treating neck pain in the absence of a compressive lesion or neurological issue is associated with poor outcomes.   \par \par If all  nonsurgical methods fail, and/or the patient develops neurologic issues then, I have recommended cervical decompression and fusion as a treatment option.  This entails removing the disk, osteophytes and the ventral uncovertebral joints along with the underlying hypertrophied/ossified posterior longitudinal ligamentum.  This will allow for a widening of the spinal canal and the neuroforamen at the effected levels.  In doing so this will likely result in added instability to the spine at this level requiring the need for instrumented stabilization and fusion.  This implies the use of anterior plate fixation and interbody prosthetics to provide strength and anatomical alignment to the operated segments.  \par \par Risks and benefits of surgery were described to the patient in detail which include but not excluding those otherwise not mentioned:  coma paralysis, death, stroke, spinal fluid leak, nerve injury, weakness, infection, hardware malfunction/failure/mal-positioning requiring re-operation, vascular injury, nonunion/pseudoarthrosis, adjacent segment degeneration, dysphonia/dysphagia and persistent pain.\par \par I have reviewed the images in detail with the patient today in my office and have answered all questions regarding this condition to the best of my ability to the patient’s satisfaction.

## 2021-10-04 DIAGNOSIS — M47.812 SPONDYLOSIS W/OUT MYELOPATHY OR RADICULOPATHY, CERVICAL REGION: ICD-10-CM

## 2021-10-04 DIAGNOSIS — M50.20 OTHER CERVICAL DISC DISPLACEMENT, UNSPECIFIED CERVICAL REGION: ICD-10-CM

## 2021-12-29 ENCOUNTER — APPOINTMENT (OUTPATIENT)
Dept: THORACIC SURGERY | Facility: CLINIC | Age: 41
End: 2021-12-29
Payer: COMMERCIAL

## 2021-12-29 DIAGNOSIS — C37 MALIGNANT NEOPLASM OF THYMUS: ICD-10-CM

## 2021-12-29 PROCEDURE — 99213 OFFICE O/P EST LOW 20 MIN: CPT | Mod: 95

## 2021-12-29 NOTE — CONSULT LETTER
[Dear  ___] : Dear  [unfilled], [Courtesy Letter:] : I had the pleasure of seeing your patient, [unfilled], in my office today. [Please see my note below.] : Please see my note below. [Sincerely,] : Sincerely, [FreeTextEntry2] : Dr. Michael Alvarado (Neuro/ref)  [FreeTextEntry3] : Reza Callahan MD, FACS \par , Division of Thoracic Surgery \par [facility] \par Chief, Thoracic Surgery \par [facility] \par Department of Cardiovascular & Thoracic Surgery \par  \par [facility]\par

## 2021-12-29 NOTE — ASSESSMENT
[FreeTextEntry1] : Ms. 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 CT scan with stable findings. Recommendation to return to clinic in 18 months with repeat CT chest, no contrast to re-evaluate stability. Strong recommendation for patient to continue follow up with Neuro. \par \par Recommendations reviewed with patient during this office visit, and all questions answered; Patient instructed on the importance of follow up and verbalizes understanding.\par \par I personally performed the services described in the documentation, reviewed the documentation recorded by the scribe in my presence and it accurately and completely records my words and actions.\par \par I, VANDANA Balbuena-C, am scribing for and the presence of ALEXUS Naylor, the following sections HISTORY OF PRESENT ILLNESS, PAST MEDICAL/FAMILY/SOCIAL HISTORY; REVIEW OF SYSTEMS; VITAL SIGNS; PHYSICAL EXAM; DISPOSITION.\par \par \par

## 2021-12-29 NOTE — HISTORY OF PRESENT ILLNESS
[Medical Office: ([facility])___] : at the medical office located in  [Verbal consent obtained from patient] : the patient, [unfilled] [FreeTextEntry1] : Ms. RAFITA DOBBS, 41 year old female, former smoker (4 years 1/2 ppd, quit 2006), with no significant past medical history who presented with diplopia and ptosis in 12/2020 soon after diagnosed with Myasthenia Gravis; further workup demonstrated mediastinal mass on Chest CT 2/2021.\par  \par Now s/p Left video thoracoscopy, robotic assisted total thymectomy, and removal  of mediastinal mass on 5/25/2021; path of thymectomy revealed Thymoma Type AB with negative surgical margins, Masoaka Stage I, pT1aNx, 3 x 2.6 x 1.9 cm.\par \par CT Chest on 2/5/2021 ([facility]):\par - 2.4 x 3.5 x 3.5 cm Left sided paracardiac mass anterolateral to the main pulmonary trunk (2:57) \par - Calcified left subcarinal lymph node\par - LLL calcified granuloma\par \par CT Chest on 12/17/21:\par - s/p interval thymoma resection\par \par Patient is followed today via Telehealth visit. Patient last seen 9/8/21-reported she had started experiencing new onset ptosis to left eye, (previous history of ptosis to right) and new onset tingling to left cheek. Established care with Dr. Griffin, Prednisone dose decreased and Mestinon dose increased. She has established are with neurosurgeon and diagnosed with cervical spondylosis, stenosis, and disk herniation. Today, Patient feels well. No muscle weakness upon repetitive movement. Relays sensation of eyelid flickering if she does not take medication or if she takes it too late. Today, patient denies worsening SOB, chest pain, cough, hemoptysis, fever, chills, night sweats, lightheadedness or dizziness.\par \par \par \par

## 2022-01-06 ENCOUNTER — APPOINTMENT (OUTPATIENT)
Dept: NEUROLOGY | Facility: CLINIC | Age: 42
End: 2022-01-06

## 2022-03-16 ENCOUNTER — APPOINTMENT (OUTPATIENT)
Dept: NEUROLOGY | Facility: CLINIC | Age: 42
End: 2022-03-16
Payer: SELF-PAY

## 2022-03-16 VITALS
WEIGHT: 160 LBS | SYSTOLIC BLOOD PRESSURE: 135 MMHG | DIASTOLIC BLOOD PRESSURE: 79 MMHG | HEART RATE: 79 BPM | BODY MASS INDEX: 26.66 KG/M2 | HEIGHT: 65 IN

## 2022-03-16 DIAGNOSIS — M54.12 RADICULOPATHY, CERVICAL REGION: ICD-10-CM

## 2022-03-16 PROCEDURE — 99204 OFFICE O/P NEW MOD 45 MIN: CPT

## 2022-03-16 RX ORDER — MYCOPHENOLATE MOFETIL 500 MG/1
500 TABLET ORAL TWICE DAILY
Qty: 180 | Refills: 1 | Status: ACTIVE | COMMUNITY
Start: 2022-03-16 | End: 1900-01-01

## 2022-03-16 NOTE — HISTORY OF PRESENT ILLNESS
[FreeTextEntry1] : Ms. Tiffany Valdivia is a 41-year-old right-handed patient was evaluated by Dr. Griffin on September 1, 2021.  In December 2020, she experienced fluctuating diplopia and ptosis.  She had no bulbar or generalized weakness.  She was begun on high-dose prednisone by Dr. Alvarado in February 2021.  She was diagnosed with thymoma and underwent resection in May 2021.\par \par She was later begun on pyridostigmine and is currently on 60 mg 4 times a day.  She was on prednisone 15 mg when she saw Dr. Griffin in September.  He gradually decreased her dose to 10 mg/day.  She developed recurrence of diplopia and ptosis.  She increased her dose to 20 mg/day.\par \par She recently experienced right-sided neck pain with radiation to the right upper extremity with weakness and numbness.  MRI of the cervical spine revealed a right-sided disc herniation at C6-7 with severe compression of the C7 nerve root.  She was evaluated by an orthopedic surgeon who recommended surgery but she declined.  Her symptoms have abated.  She has mild numbness of her left fifth finger.\par \par Past surgical history is notable for thymectomy, left knee procedures, tonsillectomy and breast reduction.  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  She has no drug allergies.  Medications include pyridostigmine 60 mg 4 times a day and prednisone 20 mg/day.  She is a former smoker.  She drinks socially.  She is .  She works for a physical therapist.  Family history is notable for hyperlipidemia, hypertension and cancer.

## 2022-03-16 NOTE — ASSESSMENT
[FreeTextEntry1] : Ms. Valdivia is a 41-year-old with acetylcholine receptor positive myasthenia gravis with isolated ocular symptoms.  She underwent thymectomy almost a year ago.  She is on tapering doses of steroids but developed recurrence of symptoms when she decreased her dose from 15 to 10 mg daily.  She remains on pyridostigmine 240 mg/day.\par \par She recently experienced an episode of right C7 radiculopathy due to herniated disc.  She declined surgical intervention.\par \par She has been on long-term steroids.  The potential risks were explained including osteoporosis and ocular issues like cataracts and central serous retinopathy.  It would appear prudent to begin a steroid sparing agent, for example mycophenolate or azathioprine.  This would require monitoring of her bloods but unfortunately she has no insurance coverage and is financially unable to pay for this.  I prescribed mycophenolate.  She will check its cost and clarify her insurance issues.  She will also begin calcium and vitamin D supplementation.\par \par Further management will depend upon her clinical course.  I suggested close telephone and office follow-up.

## 2022-03-16 NOTE — PHYSICAL EXAM
[FreeTextEntry1] : Constitutional:  Patient was well-developed, well-nourished and in no acute distress. \par \par Head:  Normocephalic, atraumatic. Tympanic membranes were clear. \par \par Neck:  Supple with full range of motion. \par \par Cardiovascular:  Cardiac rhythm was regular without murmur. There were no carotid bruits. Peripheral pulses were full and symmetric. \par \par Respiratory:  Lungs were clear. \par \par Abdomen:  Soft and nontender. \par \par Spine:  Nontender. \par \par Skin:  There were no rashes. \par \par NEUROLOGICAL EXAMINATION:\par \par Mental Status: Patient was alert and oriented. Speech was fluent. There was no dysarthria. \par \par Cranial Nerves: \par \par II: Visual acuity was 20/20-1 in the right eye and 20/20 in the left eye with near card. Pupils were equal and reactive. Visual fields were full. Funduscopic examination was normal. \par \par III, IV, VI: She experienced diplopia on all directions of gaze.  There appeared to be weakness of left eye abduction, abduction and elevation.  There was mild right lid ptosis.\par \par V: Facial sensation was intact. \par \par VII: Facial strength was normal. \par \par VIII: Hearing was equal. \par \par IX, X: Palatal movement was normal. Phonation was normal. \par \par XI: Sternocleidomastoids and trapezii were normal. \par \par XII: Tongue was midline and movements normal. There was no lingual atrophy or fasciculations. \par \par Motor Examination: Muscle bulk, tone and strength were normal except for mild weakness of right triceps.\par \par Sensory Examination: Pinprick, vibration and joint position sense were intact. \par \par Reflexes: DTRs were 2+ throughout except for the right triceps reflex which was absent. \par \par Plantar Responses: Plantar responses were flexor. \par \par Coordination/Cerebellar Function: There was no dysmetria on finger to nose or heel to shin testing. \par \par Gait/Stance: Gait and tandem were normal. Romberg was negative.\par

## 2022-03-17 ENCOUNTER — TRANSCRIPTION ENCOUNTER (OUTPATIENT)
Age: 42
End: 2022-03-17

## 2022-03-17 RX ORDER — PYRIDOSTIGMINE BROMIDE 60 MG/1
60 TABLET ORAL 4 TIMES DAILY
Qty: 360 | Refills: 3 | Status: ACTIVE | COMMUNITY
Start: 2022-03-17 | End: 1900-01-01

## 2022-04-05 ENCOUNTER — TRANSCRIPTION ENCOUNTER (OUTPATIENT)
Age: 42
End: 2022-04-05

## 2022-04-05 RX ORDER — PREDNISONE 5 MG/1
5 TABLET ORAL
Qty: 270 | Refills: 3 | Status: ACTIVE | COMMUNITY
Start: 2022-04-05 | End: 1900-01-01

## 2022-04-06 ENCOUNTER — TRANSCRIPTION ENCOUNTER (OUTPATIENT)
Age: 42
End: 2022-04-06

## 2022-06-17 ENCOUNTER — APPOINTMENT (OUTPATIENT)
Dept: NEUROLOGY | Facility: CLINIC | Age: 42
End: 2022-06-17

## 2022-08-08 ENCOUNTER — TRANSCRIPTION ENCOUNTER (OUTPATIENT)
Age: 42
End: 2022-08-08

## 2022-08-09 ENCOUNTER — TRANSCRIPTION ENCOUNTER (OUTPATIENT)
Age: 42
End: 2022-08-09

## 2022-08-10 ENCOUNTER — TRANSCRIPTION ENCOUNTER (OUTPATIENT)
Age: 42
End: 2022-08-10

## 2022-08-13 NOTE — ASU PATIENT PROFILE, ADULT - PMH
1902
Disorder of knee  left knee - surgeries x3 with 2 screws  Mediastinal mass    Myasthenia gravis    Smoking history

## 2022-08-18 ENCOUNTER — APPOINTMENT (OUTPATIENT)
Dept: NEUROLOGY | Facility: CLINIC | Age: 42
End: 2022-08-18

## 2022-08-18 VITALS
HEART RATE: 82 BPM | SYSTOLIC BLOOD PRESSURE: 149 MMHG | DIASTOLIC BLOOD PRESSURE: 78 MMHG | WEIGHT: 157 LBS | HEIGHT: 65 IN | BODY MASS INDEX: 26.16 KG/M2

## 2022-08-18 PROCEDURE — 99214 OFFICE O/P EST MOD 30 MIN: CPT

## 2022-08-18 NOTE — PHYSICAL EXAM
[FreeTextEntry1] : Constitutional:  Patient was well-developed, well-nourished and in no acute distress. \par \par Head:  Normocephalic, atraumatic. Tympanic membranes were clear. \par \par Neck:  Supple with full range of motion. \par \par Cardiovascular:  Cardiac rhythm was regular without murmur. There were no carotid bruits. Peripheral pulses were full and symmetric. \par \par Respiratory:  Lungs were clear. \par \par Abdomen:  Soft and nontender. \par \par Spine:  Nontender. \par \par Skin:  There were no rashes. \par \par NEUROLOGICAL EXAMINATION:\par \par Mental Status: Patient was alert and oriented. Speech was fluent. There was no dysarthria. \par \par Cranial Nerves: \par \par II: Visual acuity was 20/20-1 in the right eye and 20/25 in the left eye with near card. Pupils were equal and reactive. Visual fields were full. Funduscopic examination was normal. \par \par III, IV, VI: She experienced diplopia on all directions of gaze.  Her gaze however was not obviously disconjugate.  She had fluctuating right upper lid ptosis.\par \par V: Facial sensation was intact. \par \par VII: There was weakness of her orbicularis oculi.  There was no weakness of the frontalis or orbicularis oris.\par \par VIII: Hearing was equal. \par \par IX, X: Palatal movement was normal. Phonation was normal. \par \par XI: Sternocleidomastoids and trapezii were normal.  There was no weakness of neck flexion or extension.\par \par XII: Tongue was midline and movements normal. There was no lingual atrophy or fasciculations. \par \par Motor Examination: Muscle bulk, tone and strength were normal except for weakness of left shoulder abduction and bilateral hip flexion.  There was mild weakness of her hand intrinsic muscles.\par \par Sensory Examination: Pinprick, vibration and joint position sense were intact. \par \par Reflexes: DTRs were 2+ throughout except for the right triceps reflex which was absent. \par \par Plantar Responses: Plantar responses were flexor. \par \par Coordination/Cerebellar Function: There was no dysmetria on finger to nose or heel to shin testing. \par \par Gait/Stance: Gait and tandem were normal. Romberg was negative.\par

## 2022-08-18 NOTE — ASSESSMENT
[FreeTextEntry1] : Ms. Valdivia is a 42-year-old with acetylcholine receptor positive myasthenia gravis.  She initially presented with purely ocular myasthenia but has now developed bulbar and generalized symptoms.  She is on prednisone 20 mg/day and pyridostigmine.\par \par Ideally, I would like to start a steroid sparing agent preferably mycophenolate.  This would require monitoring of her CBC.  These steroid sparing agents take at least 6 to 12 months to have an effect.  I am hesitant to further increase her steroid dose.  I will continue pyridostigmine.  I recommend treatment with either IVIG or Vyvgart.  This is problematic given lack of health insurance.\par \par I will investigate whether she is eligible for the patient assistance program through the  of V[employer].  Otherwise, I suggested that she proceed to the emergency room for treatment with IVIG.  Further management will depend upon her clinical course.

## 2022-08-18 NOTE — REVIEW OF SYSTEMS
[Arm Weakness] : arm weakness [Hand Weakness] :  hand weakness [Numbness] : numbness [Tingling] : tingling [Negative] : Heme/Lymph [de-identified] : Fluctuating diplopia and ptosis.

## 2022-08-18 NOTE — HISTORY OF PRESENT ILLNESS
[FreeTextEntry1] : Ms. Tiffany Valdivia returned to the office having been initially evaluated on 1/16/2020.  She is a 42-year-old right-handed patient was evaluated by Dr. Griffin on September 1, 2021.  In December 2020, she experienced fluctuating diplopia and ptosis.  She had no bulbar or generalized weakness.  She was begun on high-dose prednisone by Dr. Alvarado in February 2021.  She was diagnosed with thymoma and underwent resection in May 2021.\par \par She had recently experienced right-sided neck pain with radiation to the right upper extremity with weakness and numbness.  MRI of the cervical spine revealed a right-sided disc herniation at C6-7 with severe compression of the C7 nerve root.  She was evaluated by an orthopedic surgeon who recommended surgery but she declined.  Her symptoms have abated.  She had mild numbness of her left fifth finger.\par \par She has experienced increasing weakness over the past 3 weeks.  She has difficulty holding up her head.  She notes weakness of both arms particularly the right.  Her legs are unaffected.  She has occasional numbness and tingling of her right arm.  Her neck pain has resolved.  Her breathing is a bit labored.  Her tongue feels swollen and her jaw fatigues when eating.  She has constant diplopia and fluctuating ptosis.\par \par As a consequence, she is increased her prednisone dose from 10 to 20 mg/day and continues on pyridostigmine 60 mg 4 times a day.\par \par I have recommended treatment with mycophenolate which will require blood monitoring.  Unfortunately, she has no health insurance and declined.\par \par Past surgical history is notable for thymectomy, left knee procedures, tonsillectomy and breast reduction.  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  She has no drug allergies.  Medications include pyridostigmine 60 mg 4 times a day and prednisone 20 mg/day.  She is a former smoker.  She drinks socially.  She is .  She works for a physical therapist.  Family history is notable for hyperlipidemia, hypertension and cancer.

## 2022-08-31 ENCOUNTER — NON-APPOINTMENT (OUTPATIENT)
Age: 42
End: 2022-08-31

## 2022-09-02 ENCOUNTER — TRANSCRIPTION ENCOUNTER (OUTPATIENT)
Age: 42
End: 2022-09-02

## 2022-09-02 RX ORDER — PREDNISONE 10 MG/1
10 TABLET ORAL
Qty: 180 | Refills: 3 | Status: ACTIVE | COMMUNITY
Start: 2022-09-02 | End: 1900-01-01

## 2022-09-12 ENCOUNTER — APPOINTMENT (OUTPATIENT)
Dept: NEUROLOGY | Facility: CLINIC | Age: 42
End: 2022-09-12

## 2022-09-23 ENCOUNTER — APPOINTMENT (OUTPATIENT)
Dept: NEUROLOGY | Facility: CLINIC | Age: 42
End: 2022-09-23

## 2022-09-23 PROCEDURE — 96365 THER/PROPH/DIAG IV INF INIT: CPT

## 2022-09-30 ENCOUNTER — APPOINTMENT (OUTPATIENT)
Dept: NEUROLOGY | Facility: CLINIC | Age: 42
End: 2022-09-30

## 2022-09-30 PROCEDURE — 96365 THER/PROPH/DIAG IV INF INIT: CPT

## 2022-10-07 ENCOUNTER — APPOINTMENT (OUTPATIENT)
Dept: NEUROLOGY | Facility: CLINIC | Age: 42
End: 2022-10-07

## 2022-10-07 PROCEDURE — 96365 THER/PROPH/DIAG IV INF INIT: CPT

## 2022-10-14 ENCOUNTER — APPOINTMENT (OUTPATIENT)
Dept: NEUROLOGY | Facility: CLINIC | Age: 42
End: 2022-10-14

## 2022-10-14 PROCEDURE — 96365 THER/PROPH/DIAG IV INF INIT: CPT

## 2022-11-04 ENCOUNTER — NON-APPOINTMENT (OUTPATIENT)
Age: 42
End: 2022-11-04

## 2022-11-07 NOTE — PATIENT PROFILE ADULT - HAVE YOU EXPERIENCED VIOLENCE OR A TRAUMATIC EVENT?
Clear fluid hydration. Rest. Take all medications as prescribed. If symptoms persist or worsen please call or return to clinic ASAP. Methylprednisolone to be given daily at 15 mg orally x5 days. Clear water hydration and clear fluids. Minimize dairy intake. no

## 2022-11-14 ENCOUNTER — APPOINTMENT (OUTPATIENT)
Dept: NEUROLOGY | Facility: CLINIC | Age: 42
End: 2022-11-14

## 2022-11-14 PROCEDURE — 96365 THER/PROPH/DIAG IV INF INIT: CPT

## 2022-11-21 ENCOUNTER — APPOINTMENT (OUTPATIENT)
Dept: NEUROLOGY | Facility: CLINIC | Age: 42
End: 2022-11-21

## 2022-11-21 PROCEDURE — 96365 THER/PROPH/DIAG IV INF INIT: CPT

## 2022-11-28 ENCOUNTER — APPOINTMENT (OUTPATIENT)
Dept: NEUROLOGY | Facility: CLINIC | Age: 42
End: 2022-11-28

## 2022-11-28 PROCEDURE — 96365 THER/PROPH/DIAG IV INF INIT: CPT

## 2022-12-05 ENCOUNTER — TRANSCRIPTION ENCOUNTER (OUTPATIENT)
Age: 42
End: 2022-12-05

## 2022-12-05 ENCOUNTER — APPOINTMENT (OUTPATIENT)
Dept: NEUROLOGY | Facility: CLINIC | Age: 42
End: 2022-12-05

## 2022-12-05 PROCEDURE — 96365 THER/PROPH/DIAG IV INF INIT: CPT

## 2022-12-06 ENCOUNTER — OUTPATIENT (OUTPATIENT)
Dept: OUTPATIENT SERVICES | Facility: HOSPITAL | Age: 42
LOS: 1 days | End: 2022-12-06
Payer: SELF-PAY

## 2022-12-06 ENCOUNTER — LABORATORY RESULT (OUTPATIENT)
Age: 42
End: 2022-12-06

## 2022-12-06 ENCOUNTER — APPOINTMENT (OUTPATIENT)
Dept: RADIOLOGY | Facility: CLINIC | Age: 42
End: 2022-12-06

## 2022-12-06 ENCOUNTER — APPOINTMENT (OUTPATIENT)
Dept: NEUROLOGY | Facility: CLINIC | Age: 42
End: 2022-12-06

## 2022-12-06 ENCOUNTER — NON-APPOINTMENT (OUTPATIENT)
Age: 42
End: 2022-12-06

## 2022-12-06 VITALS
SYSTOLIC BLOOD PRESSURE: 137 MMHG | DIASTOLIC BLOOD PRESSURE: 72 MMHG | HEIGHT: 65 IN | WEIGHT: 164 LBS | BODY MASS INDEX: 27.32 KG/M2 | HEART RATE: 80 BPM

## 2022-12-06 DIAGNOSIS — C37 MALIGNANT NEOPLASM OF THYMUS: ICD-10-CM

## 2022-12-06 DIAGNOSIS — G70.00 MYASTHENIA GRAVIS WITHOUT (ACUTE) EXACERBATION: ICD-10-CM

## 2022-12-06 PROCEDURE — 71046 X-RAY EXAM CHEST 2 VIEWS: CPT | Mod: 26

## 2022-12-06 PROCEDURE — 71046 X-RAY EXAM CHEST 2 VIEWS: CPT

## 2022-12-06 PROCEDURE — 99213 OFFICE O/P EST LOW 20 MIN: CPT

## 2022-12-06 NOTE — REVIEW OF SYSTEMS
[Arm Weakness] : arm weakness [Hand Weakness] :  hand weakness [Numbness] : numbness [Tingling] : tingling [Shortness Of Breath] : shortness of breath [Negative] : Heme/Lymph [de-identified] : Fluctuating diplopia and ptosis.

## 2022-12-06 NOTE — HISTORY OF PRESENT ILLNESS
[FreeTextEntry1] : Ms. Tiffany Valdivia returned to the office having been last seen on August 18, 2022.  She is a 42-year-old right-handed patient was evaluated by Dr. Griffin on September 1, 2021.  In December 2020, she experienced fluctuating diplopia and ptosis.  She had no bulbar or generalized weakness.  She was begun on high-dose prednisone by Dr. Alvarado in February 2021.  She was diagnosed with thymoma and underwent resection in May 2021.\par \par She was experiencing right-sided neck pain with radiation to the right upper extremity with weakness and numbness.  MRI of the cervical spine revealed a right-sided disc herniation at C6-7 with severe compression of the C7 nerve root.  She was evaluated by an orthopedic surgeon who recommended surgery but she declined.  Her symptoms have abated.  She had mild numbness of her left fifth finger.\par \par When last seen, she complained of increasing weakness and difficulty holding her head up.  She had weakness of her arms.  She complained of occasional numbness and tingling of her right arm.  Her breathing was a bit labored.  Her tongue felt swollen and her jaw fatigue when eating.  She complained of constant diplopia and fluctuating ptosis.\par \par In response, her prednisone dose was increased from 10 to 20 mg/day and she continued pyridostigmine 60 mg 4 times a day.  She was begun on Vyvgart and completed her second cycle yesterday.  She reports improvement in her symptoms.\par \par She complains that her face and legs feel swollen.  Her tongue has been burning for 2 months.  She has noted changes in her nailbeds for 2 weeks.  She complains of easy bruisability.  She is easily short of breath and experiences palpitations.  She her weight has increased from 155 to164.  She denies diplopia or significant ptosis.  A few weeks ago, she decreased her prednisone from 20 to 15 mg/day without discussion with me for\par \par She still does not have any health insurance.\par \par Past surgical history is notable for thymectomy, left knee procedures, tonsillectomy and breast reduction.  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  She has no drug allergies.  Medications include Vyvgart, pyridostigmine 60 mg 4 times a day and prednisone 15 mg/day.  She is a former smoker.  She drinks socially.  She is .  She works for a physical therapist.  Family history is notable for hyperlipidemia, hypertension and cancer.

## 2022-12-06 NOTE — PHYSICAL EXAM
[FreeTextEntry1] : Constitutional:  Patient was well-developed, well-nourished and in no acute distress. \par \par Head:  Normocephalic, atraumatic. Tympanic membranes were clear. \par \par Neck:  Supple with full range of motion. \par \par Cardiovascular:  Cardiac rhythm was regular without murmur. There were no carotid bruits. Peripheral pulses were full and symmetric. \par \par Respiratory:  Lungs were clear. \par \par Abdomen:  Soft and nontender. \par \par Spine:  Nontender. \par \par Skin:  There were no rashes. \par \par NEUROLOGICAL EXAMINATION:\par \par Mental Status: Patient was alert and oriented. Speech was fluent. There was no dysarthria. \par \par Cranial Nerves: \par \par II: Visual acuity was 20/20 bilaterally with near card. Pupils were equal and reactive. Visual fields were full. Funduscopic examination was normal. \par \par III, IV, VI: Eye movements were conjugate but she complained of horizontal diplopia on far lateral gaze, right and left due to abduction weakness in each eye.\par \par V: Facial sensation was intact. \par \par VII: There was no facial weakness.\par \par VIII: Hearing was equal. \par \par IX, X: Palatal movement was normal. Phonation was normal. \par \par XI: Sternocleidomastoids and trapezii were normal.  There was no weakness of neck flexion or extension.\par \par XII: Tongue was midline and movements normal. There was no lingual atrophy or fasciculations.  The middle of her tongue was mildly denuded but there were no exudates.\par \par Motor Examination: Muscle bulk, tone and strength were normal.  She was able to rise from a seated position without using her arms.  MG ADL score was 7.  MGFA class IIb\par \par Sensory Examination: Pinprick, vibration and joint position sense were intact. \par \par Reflexes: DTRs were 2+ throughout except for the right triceps reflex which was absent. \par \par Plantar Responses: Plantar responses were flexor. \par \par Coordination/Cerebellar Function: There was no dysmetria on finger to nose or heel to shin testing. \par \par Gait/Stance: Gait and tandem were normal. Romberg was negative.\par

## 2022-12-07 LAB
ALBUMIN SERPL ELPH-MCNC: 4.5 G/DL
ALP BLD-CCNC: 49 U/L
ALT SERPL-CCNC: 12 U/L
ANION GAP SERPL CALC-SCNC: 11 MMOL/L
APPEARANCE: ABNORMAL
AST SERPL-CCNC: 13 U/L
BACTERIA: NEGATIVE
BASOPHILS # BLD AUTO: 0.03 K/UL
BASOPHILS NFR BLD AUTO: 0.4 %
BILIRUB SERPL-MCNC: 0.2 MG/DL
BILIRUBIN URINE: NEGATIVE
BLOOD URINE: NEGATIVE
BUN SERPL-MCNC: 20 MG/DL
CALCIUM SERPL-MCNC: 9 MG/DL
CHLORIDE SERPL-SCNC: 111 MMOL/L
CO2 SERPL-SCNC: 20 MMOL/L
COLOR: NORMAL
CREAT SERPL-MCNC: 0.65 MG/DL
EGFR: 111 ML/MIN/1.73M2
EOSINOPHIL # BLD AUTO: 0.06 K/UL
EOSINOPHIL NFR BLD AUTO: 0.9 %
GLUCOSE QUALITATIVE U: NEGATIVE
GLUCOSE SERPL-MCNC: 92 MG/DL
HCT VFR BLD CALC: 21.5 %
HGB BLD-MCNC: 5.3 G/DL
HYALINE CASTS: 3 /LPF
IMM GRANULOCYTES NFR BLD AUTO: 0.3 %
KETONES URINE: NEGATIVE
LEUKOCYTE ESTERASE URINE: ABNORMAL
LYMPHOCYTES # BLD AUTO: 1.6 K/UL
LYMPHOCYTES NFR BLD AUTO: 23.8 %
MAN DIFF?: NORMAL
MCHC RBC-ENTMCNC: 15.8 PG
MCHC RBC-ENTMCNC: 24.7 GM/DL
MCV RBC AUTO: 64.2 FL
MICROSCOPIC-UA: NORMAL
MONOCYTES # BLD AUTO: 0.55 K/UL
MONOCYTES NFR BLD AUTO: 8.2 %
NEUTROPHILS # BLD AUTO: 4.46 K/UL
NEUTROPHILS NFR BLD AUTO: 66.4 %
NITRITE URINE: NEGATIVE
PH URINE: 6.5
PLATELET # BLD AUTO: 311 K/UL
POTASSIUM SERPL-SCNC: 3.8 MMOL/L
PROT SERPL-MCNC: 6.2 G/DL
PROTEIN URINE: NORMAL
RBC # BLD: 3.35 M/UL
RBC # FLD: 18.9 %
RED BLOOD CELLS URINE: 3 /HPF
SODIUM SERPL-SCNC: 142 MMOL/L
SPECIFIC GRAVITY URINE: 1.01
SQUAMOUS EPITHELIAL CELLS: 19 /HPF
TSH SERPL-ACNC: 1.45 UIU/ML
UROBILINOGEN URINE: NORMAL
WBC # FLD AUTO: 6.72 K/UL
WHITE BLOOD CELLS URINE: 29 /HPF

## 2022-12-08 LAB — BACTERIA UR CULT: NORMAL

## 2023-01-06 ENCOUNTER — APPOINTMENT (OUTPATIENT)
Dept: NEUROLOGY | Facility: CLINIC | Age: 43
End: 2023-01-06

## 2023-01-13 ENCOUNTER — APPOINTMENT (OUTPATIENT)
Dept: NEUROLOGY | Facility: CLINIC | Age: 43
End: 2023-01-13

## 2023-01-20 ENCOUNTER — APPOINTMENT (OUTPATIENT)
Dept: NEUROLOGY | Facility: CLINIC | Age: 43
End: 2023-01-20

## 2023-01-27 ENCOUNTER — APPOINTMENT (OUTPATIENT)
Dept: NEUROLOGY | Facility: CLINIC | Age: 43
End: 2023-01-27

## 2023-02-21 ENCOUNTER — TRANSCRIPTION ENCOUNTER (OUTPATIENT)
Age: 43
End: 2023-02-21

## 2023-02-22 ENCOUNTER — NON-APPOINTMENT (OUTPATIENT)
Age: 43
End: 2023-02-22

## 2023-02-22 LAB
ALBUMIN SERPL ELPH-MCNC: 4 G/DL
ALP BLD-CCNC: 73 U/L
ALT SERPL-CCNC: 17 U/L
ANION GAP SERPL CALC-SCNC: 11 MMOL/L
AST SERPL-CCNC: 17 U/L
BASOPHILS # BLD AUTO: 0.03 K/UL
BASOPHILS NFR BLD AUTO: 0.4 %
BILIRUB SERPL-MCNC: <0.2 MG/DL
BUN SERPL-MCNC: 12 MG/DL
CALCIUM SERPL-MCNC: 9.1 MG/DL
CHLORIDE SERPL-SCNC: 108 MMOL/L
CO2 SERPL-SCNC: 25 MMOL/L
CREAT SERPL-MCNC: 0.65 MG/DL
EGFR: 111 ML/MIN/1.73M2
EOSINOPHIL # BLD AUTO: 0.21 K/UL
EOSINOPHIL NFR BLD AUTO: 2.5 %
FERRITIN SERPL-MCNC: 23 NG/ML
GLUCOSE SERPL-MCNC: 93 MG/DL
HCT VFR BLD CALC: 32.3 %
HGB BLD-MCNC: 9.6 G/DL
IMM GRANULOCYTES NFR BLD AUTO: 0.4 %
IRON SATN MFR SERPL: 8 %
IRON SERPL-MCNC: 21 UG/DL
LYMPHOCYTES # BLD AUTO: 1.43 K/UL
LYMPHOCYTES NFR BLD AUTO: 16.8 %
MAN DIFF?: NORMAL
MCHC RBC-ENTMCNC: 29.7 GM/DL
MCHC RBC-ENTMCNC: 30.7 PG
MCV RBC AUTO: 103.2 FL
MONOCYTES # BLD AUTO: 0.66 K/UL
MONOCYTES NFR BLD AUTO: 7.8 %
NEUTROPHILS # BLD AUTO: 6.15 K/UL
NEUTROPHILS NFR BLD AUTO: 72.1 %
PLATELET # BLD AUTO: 271 K/UL
POTASSIUM SERPL-SCNC: 3.7 MMOL/L
PROT SERPL-MCNC: 5.6 G/DL
RBC # BLD: 3.13 M/UL
RBC # FLD: 13.3 %
SODIUM SERPL-SCNC: 145 MMOL/L
TIBC SERPL-MCNC: 277 UG/DL
TSH SERPL-ACNC: 2.45 UIU/ML
UIBC SERPL-MCNC: 257 UG/DL
WBC # FLD AUTO: 8.51 K/UL

## 2023-02-24 LAB — ACRM BINDING ANTIBODY: 6.25 NMOL/L

## 2023-02-28 ENCOUNTER — TRANSCRIPTION ENCOUNTER (OUTPATIENT)
Age: 43
End: 2023-02-28

## 2023-02-28 LAB
ACHR BLOCK AB SER QL: 57 %
ACHR MOD AB SER-ACNC: 80 %

## 2023-03-02 ENCOUNTER — TRANSCRIPTION ENCOUNTER (OUTPATIENT)
Age: 43
End: 2023-03-02

## 2023-03-06 ENCOUNTER — TRANSCRIPTION ENCOUNTER (OUTPATIENT)
Age: 43
End: 2023-03-06

## 2023-03-08 ENCOUNTER — TRANSCRIPTION ENCOUNTER (OUTPATIENT)
Age: 43
End: 2023-03-08

## 2023-03-08 RX ORDER — PREDNISONE 5 MG/1
5 TABLET ORAL
Qty: 270 | Refills: 3 | Status: ACTIVE | COMMUNITY
Start: 2023-03-08 | End: 1900-01-01

## 2023-03-08 RX ORDER — PYRIDOSTIGMINE BROMIDE 60 MG/1
60 TABLET ORAL 4 TIMES DAILY
Qty: 360 | Refills: 3 | Status: ACTIVE | COMMUNITY
Start: 2023-03-08 | End: 1900-01-01

## 2023-03-30 ENCOUNTER — TRANSCRIPTION ENCOUNTER (OUTPATIENT)
Age: 43
End: 2023-03-30

## 2023-04-03 ENCOUNTER — TRANSCRIPTION ENCOUNTER (OUTPATIENT)
Age: 43
End: 2023-04-03

## 2023-04-04 ENCOUNTER — TRANSCRIPTION ENCOUNTER (OUTPATIENT)
Age: 43
End: 2023-04-04

## 2023-04-05 ENCOUNTER — TRANSCRIPTION ENCOUNTER (OUTPATIENT)
Age: 43
End: 2023-04-05

## 2023-04-07 ENCOUNTER — TRANSCRIPTION ENCOUNTER (OUTPATIENT)
Age: 43
End: 2023-04-07

## 2023-04-10 ENCOUNTER — APPOINTMENT (OUTPATIENT)
Dept: RADIOLOGY | Facility: CLINIC | Age: 43
End: 2023-04-10
Payer: COMMERCIAL

## 2023-04-10 PROCEDURE — 77080 DXA BONE DENSITY AXIAL: CPT

## 2023-04-11 ENCOUNTER — APPOINTMENT (OUTPATIENT)
Dept: NEUROLOGY | Facility: CLINIC | Age: 43
End: 2023-04-11
Payer: COMMERCIAL

## 2023-04-11 VITALS
WEIGHT: 165 LBS | DIASTOLIC BLOOD PRESSURE: 76 MMHG | SYSTOLIC BLOOD PRESSURE: 135 MMHG | BODY MASS INDEX: 27.49 KG/M2 | HEIGHT: 65 IN | HEART RATE: 78 BPM

## 2023-04-11 PROCEDURE — 99214 OFFICE O/P EST MOD 30 MIN: CPT

## 2023-04-11 RX ORDER — SULFAMETHOXAZOLE AND TRIMETHOPRIM 800; 160 MG/1; MG/1
800-160 TABLET ORAL DAILY
Qty: 52 | Refills: 3 | Status: ACTIVE | COMMUNITY
Start: 2023-04-11 | End: 1900-01-01

## 2023-04-11 RX ORDER — PREDNISONE 5 MG/1
5 TABLET ORAL DAILY
Qty: 360 | Refills: 3 | Status: ACTIVE | COMMUNITY
Start: 2023-04-11 | End: 1900-01-01

## 2023-04-11 NOTE — HISTORY OF PRESENT ILLNESS
[FreeTextEntry1] : Ms. Tiffany Valdivia returned to the office having been last seen on December 6, 2022.  She is a 43-year-old right-handed patient was evaluated by Dr. Griffin on September 1, 2021.  In December 2020, she experienced fluctuating diplopia and ptosis.  She had no bulbar or generalized weakness.  She was begun on high-dose prednisone by Dr. Alvarado in February 2021.  She was diagnosed with thymoma and underwent resection in May 2021.\par \par She was experiencing right-sided neck pain with radiation to the right upper extremity with weakness and numbness.  MRI of the cervical spine revealed a right-sided disc herniation at C6-7 with severe compression of the C7 nerve root.  She was evaluated by an orthopedic surgeon who recommended surgery but she declined.  Her symptoms have abated.  She had mild numbness of her left fifth finger.\par \par When seen in August 2022, she complained of increasing weakness and difficulty holding her head up.  She had weakness of her arms.  She complained of occasional numbness and tingling of her right arm.  Her breathing was a bit labored.  Her tongue felt swollen and her jaw fatigue when eating.  She complained of constant diplopia and fluctuating ptosis.\par \par In response, her prednisone dose was increased from 10 to 20 mg/day and she continued pyridostigmine 60 mg 4 times a day.  She was begun on Vyvgart.  \par \par Her myasthenic symptoms improved significantly with her first and second cycles of Vyvgart.  Unfortunately her third and fourth cycles, the latter completed yesterday have been less beneficial.  She complains of worsening diffuse weakness.  She has slurring of speech, fatigue when chewing, choking, shortness of breath with exertion, difficulty combing her hair and brushing her teeth, inability to rise from a seated position without using her arms, daily diplopia and ptosis.  MG ADL score today was 14, a seven-point increase compared to December.\par \par Since last seen, she received 1 dose of Menactra on April 4.  She did not receive Bexsero.  She is currently on prednisone 15 mg daily, pyridostigmine 60 mg 4 times a day and Vyvgart.\par \par She is still experiencing rectal bleeding.  She has not yet undergone a colonoscopy.\par \par Past surgical history is notable for thymectomy, left knee procedures, tonsillectomy and breast reduction.  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  She has no drug allergies.  She is a former smoker.  She drinks socially.  She is .  She works for a physical therapist.  Family history is notable for hyperlipidemia, hypertension and cancer.

## 2023-04-11 NOTE — REVIEW OF SYSTEMS
[Arm Weakness] : arm weakness [Hand Weakness] :  hand weakness [Numbness] : numbness [Tingling] : tingling [Shortness Of Breath] : shortness of breath [Negative] : Heme/Lymph [de-identified] : Fluctuating diplopia and ptosis.

## 2023-04-11 NOTE — PHYSICAL EXAM
[FreeTextEntry1] : Constitutional:  Patient was well-developed, well-nourished and in no acute distress. \par \par Head:  Normocephalic, atraumatic. Tympanic membranes were clear. \par \par Neck:  Supple with full range of motion. \par \par Cardiovascular:  Cardiac rhythm was regular without murmur. There were no carotid bruits. Peripheral pulses were full and symmetric. \par \par Respiratory:  Lungs were clear. \par \par Abdomen:  Soft and nontender. \par \par Spine:  Nontender. \par \par Skin:  There were no rashes. \par \par NEUROLOGICAL EXAMINATION:\par \par Mental Status: Patient was alert and oriented. Speech was fluent. There was no dysarthria. \par \par Cranial Nerves: \par \par II: She could finger count bilaterally. Pupils were equal and reactive. Visual fields were full. Funduscopic examination was normal. \par \par III, IV, VI: She was orthophoric on primary gaze but complained of vertical and horizontal displacement in all other directions.\par \par V: Facial sensation was intact. \par \par VII: There was prominent bifacial weakness.  On attempted eye opening, her eyes were depressed and not elevated.\par \par VIII: Hearing was equal. \par \par IX, X: Palatal movement was normal. Phonation was normal. \par \par XI: Sternocleidomastoids and trapezii were normal.  There was prominent weakness of neck flexion and less so extension.\par \par XII: Tongue was midline and movements normal. There was no lingual atrophy or fasciculations.  \par \par Motor Examination: Muscle bulk, tone and strength were normal.  There was prominent diffuse upper extremity weakness and prominent hip flexion weakness.\par \par Sensory Examination: Pinprick, vibration and joint position sense were intact. \par \par Reflexes: DTRs were 2+ throughout except for the right triceps reflex which was absent. \par \par Plantar Responses: Plantar responses were flexor. \par \par Coordination/Cerebellar Function: There was no dysmetria on finger to nose testing. \par \par Gait/Stance: Gait was stable.  Romberg was positive.  Tandem was not attempted.\par

## 2023-04-11 NOTE — CONSULT LETTER
[Dear  ___] : Dear  [unfilled], [Consult Letter:] : I had the pleasure of evaluating your patient, [unfilled]. [Please see my note below.] : Please see my note below. [Consult Closing:] : Thank you very much for allowing me to participate in the care of this patient.  If you have any questions, please do not hesitate to contact me. [Sincerely,] : Sincerely, [FreeTextEntry3] : Sunny Young MD\par  [DrValentina  ___] : Dr. HICKS

## 2023-04-11 NOTE — ASSESSMENT
[FreeTextEntry1] : Ms. Valdivia is a 43-year-old with acetylcholine receptor positive myasthenia gravis status post resection of thymoma.  She is currently on prednisone 15 mg daily, pyridostigmine 60 mg 4 times a day and just completed her fourth cycle of Vyvgart.  She noted significant relief of symptoms after her first 2 cycles of Vyvgart but the benefits have significantly waned.  Her MG ADL score increased from 7-14 since her last visit.\par \par I suggested that she increase prednisone to 20 mg/day.  She will begin double strength Bactrim 1 tablet 3 times a week for PCP prophylaxis.  I suggested increasing pyridostigmine to 90 mg 4 times a day as tolerated.  If she develops diarrhea, treatment with hyoscyamine can be considered.  I suggested that she received IVIG 2 g/kg over 5 days.  This can be repeated in 4 weeks if necessary.  Given her resistant myasthenia, I suggested treatment with Ultomiris to inhibit complement destruction of her neuromuscular junction.  She will require vaccination with meningococcal A and B vaccines.  I am hesitant to use azathioprine or mycophenolate in view of her anemia and GI bleeding.  The benefits would not be noted for months anyway.  I will order a follow-up CT of the chest with IV contrast.  Further management will depend upon her clinical course.

## 2023-04-24 ENCOUNTER — APPOINTMENT (OUTPATIENT)
Dept: CT IMAGING | Facility: CLINIC | Age: 43
End: 2023-04-24
Payer: COMMERCIAL

## 2023-04-24 PROCEDURE — 71260 CT THORAX DX C+: CPT

## 2023-04-26 ENCOUNTER — TRANSCRIPTION ENCOUNTER (OUTPATIENT)
Age: 43
End: 2023-04-26

## 2023-04-27 ENCOUNTER — TRANSCRIPTION ENCOUNTER (OUTPATIENT)
Age: 43
End: 2023-04-27

## 2023-05-01 ENCOUNTER — TRANSCRIPTION ENCOUNTER (OUTPATIENT)
Age: 43
End: 2023-05-01

## 2023-05-03 ENCOUNTER — TRANSCRIPTION ENCOUNTER (OUTPATIENT)
Age: 43
End: 2023-05-03

## 2023-05-03 ENCOUNTER — NON-APPOINTMENT (OUTPATIENT)
Age: 43
End: 2023-05-03

## 2023-05-04 ENCOUNTER — TRANSCRIPTION ENCOUNTER (OUTPATIENT)
Age: 43
End: 2023-05-04

## 2023-05-18 ENCOUNTER — TRANSCRIPTION ENCOUNTER (OUTPATIENT)
Age: 43
End: 2023-05-18

## 2023-05-18 RX ORDER — PYRIDOSTIGMINE BROMIDE 60 MG/1
60 TABLET ORAL
Qty: 540 | Refills: 3 | Status: ACTIVE | COMMUNITY
Start: 2023-05-18 | End: 1900-01-01

## 2023-05-19 ENCOUNTER — TRANSCRIPTION ENCOUNTER (OUTPATIENT)
Age: 43
End: 2023-05-19

## 2023-05-26 ENCOUNTER — TRANSCRIPTION ENCOUNTER (OUTPATIENT)
Age: 43
End: 2023-05-26

## 2023-08-15 ENCOUNTER — TRANSCRIPTION ENCOUNTER (OUTPATIENT)
Age: 43
End: 2023-08-15

## 2023-08-16 ENCOUNTER — TRANSCRIPTION ENCOUNTER (OUTPATIENT)
Age: 43
End: 2023-08-16

## 2023-11-20 ENCOUNTER — TRANSCRIPTION ENCOUNTER (OUTPATIENT)
Age: 43
End: 2023-11-20

## 2023-11-21 ENCOUNTER — TRANSCRIPTION ENCOUNTER (OUTPATIENT)
Age: 43
End: 2023-11-21

## 2023-11-22 ENCOUNTER — TRANSCRIPTION ENCOUNTER (OUTPATIENT)
Age: 43
End: 2023-11-22

## 2023-11-24 ENCOUNTER — APPOINTMENT (OUTPATIENT)
Dept: NEUROLOGY | Facility: CLINIC | Age: 43
End: 2023-11-24
Payer: COMMERCIAL

## 2023-11-24 VITALS
BODY MASS INDEX: 27.49 KG/M2 | HEART RATE: 79 BPM | HEIGHT: 65 IN | SYSTOLIC BLOOD PRESSURE: 174 MMHG | DIASTOLIC BLOOD PRESSURE: 94 MMHG | WEIGHT: 165 LBS

## 2023-11-24 DIAGNOSIS — R20.0 ANESTHESIA OF SKIN: ICD-10-CM

## 2023-11-24 PROCEDURE — 99214 OFFICE O/P EST MOD 30 MIN: CPT

## 2023-11-24 RX ORDER — PYRIDOSTIGMINE BROMIDE 180 MG/1
180 TABLET ORAL
Qty: 180 | Refills: 3 | Status: ACTIVE | COMMUNITY
Start: 2023-11-24 | End: 1900-01-01

## 2023-12-19 ENCOUNTER — APPOINTMENT (OUTPATIENT)
Dept: MRI IMAGING | Facility: CLINIC | Age: 43
End: 2023-12-19
Payer: COMMERCIAL

## 2023-12-19 PROCEDURE — 72148 MRI LUMBAR SPINE W/O DYE: CPT

## 2023-12-28 ENCOUNTER — TRANSCRIPTION ENCOUNTER (OUTPATIENT)
Age: 43
End: 2023-12-28

## 2023-12-28 ENCOUNTER — RESULT REVIEW (OUTPATIENT)
Age: 43
End: 2023-12-28

## 2023-12-28 DIAGNOSIS — N94.89 OTHER SPECIFIED CONDITIONS ASSOCIATED WITH FEMALE GENITAL ORGANS AND MENSTRUAL CYCLE: ICD-10-CM

## 2024-01-03 ENCOUNTER — APPOINTMENT (OUTPATIENT)
Dept: ULTRASOUND IMAGING | Facility: CLINIC | Age: 44
End: 2024-01-03
Payer: COMMERCIAL

## 2024-01-03 PROCEDURE — 76830 TRANSVAGINAL US NON-OB: CPT

## 2024-01-04 ENCOUNTER — TRANSCRIPTION ENCOUNTER (OUTPATIENT)
Age: 44
End: 2024-01-04

## 2024-01-05 ENCOUNTER — TRANSCRIPTION ENCOUNTER (OUTPATIENT)
Age: 44
End: 2024-01-05

## 2024-02-06 ENCOUNTER — TRANSCRIPTION ENCOUNTER (OUTPATIENT)
Age: 44
End: 2024-02-06

## 2024-02-07 ENCOUNTER — TRANSCRIPTION ENCOUNTER (OUTPATIENT)
Age: 44
End: 2024-02-07

## 2024-02-07 NOTE — PHYSICAL EXAM
[FreeTextEntry1] : Constitutional:  Patient was well-developed, well-nourished and in no acute distress.   Head:  Normocephalic, atraumatic. Tympanic membranes were clear.   Neck:  Supple with full range of motion.   Cardiovascular:  Cardiac rhythm was regular without murmur. There were no carotid bruits. Peripheral pulses were full and symmetric.   Respiratory:  Lungs were clear.   Abdomen:  Soft and nontender.   Spine:  Nontender.   Skin:  There were no rashes.   NEUROLOGICAL EXAMINATION:  Mental Status: Patient was alert and oriented. Speech was fluent. There was no dysarthria.   Cranial Nerves:   II: Visual acuity was 20/20- 1 bilaterally without correction. Pupils were equal and reactive. Visual fields were full. Funduscopic examination was normal.   III, IV, VI: She was orthophoric on primary gaze on upgaze her right eye deviated laterally.  V: Facial sensation was intact.   VII: Facial strength was quite good.  There was fluctuating right greater than left ptosis.  VIII: Hearing was equal.   IX, X: Palatal movement was normal. Phonation was normal.   XI: Sternocleidomastoids and trapezii were normal.  There was prominent weakness of neck flexion and less so extension.  XII: Tongue was midline and movements normal. There was no lingual atrophy or fasciculations.    Motor Examination: Muscle bulk, tone and strength were normal.    Sensory Examination: Pinprick, vibration and joint position sense were intact except for decreased pinprick on the right shin.   Reflexes: DTRs were 2+ throughout.   Plantar Responses: Plantar responses were flexor.   Coordination/Cerebellar Function: There was no dysmetria on finger to nose testing.   Gait/Stance: Gait was stable.  Romberg was negative.

## 2024-02-07 NOTE — HISTORY OF PRESENT ILLNESS
[FreeTextEntry1] : Ms. Tiffany Valdivia returned to the office having been last seen on April 11, 2023.  She is a 43-year-old right-handed patient was evaluated by Dr. Griffin on September 1, 2021.  In December 2020, she experienced fluctuating diplopia and ptosis.  She had no bulbar or generalized weakness.  She was begun on high-dose prednisone by Dr. Alvarado in February 2021.  She was diagnosed with thymoma and underwent resection in May 2021.  She was experiencing right-sided neck pain with radiation to the right upper extremity with weakness and numbness.  MRI of the cervical spine revealed a right-sided disc herniation at C6-7 with severe compression of the C7 nerve root.  She was evaluated by an orthopedic surgeon who recommended surgery but she declined.  Her symptoms have abated.  She had mild numbness of her left fifth finger.  When seen in August 2022, she complained of increasing weakness and difficulty holding her head up.  She had weakness of her arms.  She complained of occasional numbness and tingling of her right arm.  Her breathing was a bit labored.  Her tongue felt swollen and her jaw fatigue when eating.  She complained of constant diplopia and fluctuating ptosis.  In response, her prednisone dose was increased from 10 to 20 mg/day and she continued pyridostigmine 60 mg 4 times a day.  She was begun on Vyvgart.    Her myasthenic symptoms improved significantly with her first and second cycles of Vyvgart.  Unfortunately, her third and fourth cycles, the latter completed yesterday had been less beneficial.  She complained of worsening diffuse weakness.  She had slurring of speech, fatigue when chewing, choking, shortness of breath with exertion, difficulty combing her hair and brushing her teeth, inability to rise from a seated position without using her arms, daily diplopia and ptosis.  MG ADL score on April 11, 2023 was 14, a seven-point increase compared to December. She was still experiencing rectal bleeding.  She had received blood transfusions last year and an iron infusion.  She is currently on 2 tablets of iron daily.  She is under the care of a gastroenterologist but has not yet undergone upper or lower endoscopies.  Recent blood work was notable for hemoglobin of 12.3 and ferritin of 47.  Since last seen, she was vaccinated against meningitis A and B.  She received IVIG in May but this worsened her condition.  Several weeks ago, she received her loading dose of Ultomiris followed by her first maintenance dose and a weeks later, her second maintenance dose.  She is scheduled for her next maintenance dose on January 6, 2024.  She increased her prednisone dose up to 40 mg/day and is currently on 20 mg/day, pyridostigmine 90 mg 4 times a day, Bactrim DS 3 times a week, probiotic, Ultomiris, vitamin D, zinc, vitamin C and iron.  Today's MG ADL score was 13.  She complained of slurred speech, fatigue when chewing, shortness of breath at rest, difficulty rising from a seated position, difficulty combing her hair and brushing her teeth and constant diplopia and ptosis.  She complains of numbness of her inner thigh and calf without back pain.  She has had back pain in the past.  Past surgical history is notable for thymectomy, left knee procedures, tonsillectomy and breast reduction.  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  She has no drug allergies.  She is a former smoker.  She drinks socially.  She is .  She works as a .  Family history is notable for hyperlipidemia, hypertension and cancer.

## 2024-02-07 NOTE — ASSESSMENT
[FreeTextEntry1] : Ms. Valdivia is a 43-year-old with ocular and generalized myasthenia gravis.  She is currently on pyridostigmine, prednisone 20 mg/day, Bactrim DS 3 times a week and Ultomiris having received her second maintenance dose recently.  Despite having an MG ADL score of 13, approximately half of that number is due to ptosis and diplopia.  Overall, she does not appear to be doing that poorly.  Other therapeutic options are not very attractive including plasmaphoresis, IVIG and use of a steroid sparing agent.  I suggested continuing her current regimen for now.  Hopefully further improvement will be noted from Ultomiris.  Other issues include rectal bleeding which has not been fully evaluated.  I suspect that her right leg numbness may be on the basis of radiculopathy.  I suggested an MRI of the lumbar spine and EMG and nerve conduction studies.  Regarding chronic use of steroids, she underwent a recent bone density scan which was normal.  Her cervical disc herniation does not appear active.  Regarding her shortness of breath in the setting of a history of thymoma and myasthenia, I suggested pulmonary consultation.  I suggested a routine follow-up visit in about 3 months.  Further management will depend upon her clinical course.

## 2024-02-14 ENCOUNTER — LABORATORY RESULT (OUTPATIENT)
Age: 44
End: 2024-02-14

## 2024-02-14 ENCOUNTER — APPOINTMENT (OUTPATIENT)
Dept: NEUROLOGY | Facility: CLINIC | Age: 44
End: 2024-02-14
Payer: COMMERCIAL

## 2024-02-14 ENCOUNTER — NON-APPOINTMENT (OUTPATIENT)
Age: 44
End: 2024-02-14

## 2024-02-14 VITALS
SYSTOLIC BLOOD PRESSURE: 131 MMHG | HEART RATE: 67 BPM | BODY MASS INDEX: 27.32 KG/M2 | WEIGHT: 164 LBS | HEIGHT: 65 IN | DIASTOLIC BLOOD PRESSURE: 84 MMHG

## 2024-02-14 DIAGNOSIS — G57.31 LESION OF LATERAL POPLITEAL NERVE, RIGHT LOWER LIMB: ICD-10-CM

## 2024-02-14 PROCEDURE — 95912 NRV CNDJ TEST 11-12 STUDIES: CPT

## 2024-02-14 PROCEDURE — 99214 OFFICE O/P EST MOD 30 MIN: CPT

## 2024-02-14 PROCEDURE — 95937 NEUROMUSCULAR JUNCTION TEST: CPT

## 2024-02-14 PROCEDURE — 95886 MUSC TEST DONE W/N TEST COMP: CPT

## 2024-02-14 NOTE — CONSULT LETTER
[Dear  ___] : Dear  [unfilled], [Consult Letter:] : I had the pleasure of evaluating your patient, [unfilled]. [Please see my note below.] : Please see my note below. [Consult Closing:] : Thank you very much for allowing me to participate in the care of this patient.  If you have any questions, please do not hesitate to contact me. [Sincerely,] : Sincerely, [DrValentina  ___] : Dr. HICKS [FreeTextEntry3] : Sunny Young MD\par

## 2024-02-14 NOTE — PHYSICAL EXAM
[FreeTextEntry1] : Constitutional:  Patient was well-developed, well-nourished and in no acute distress.   Head:  Normocephalic, atraumatic. Tympanic membranes were not examined.   Neck:  Supple with full range of motion.   Cardiovascular:  Cardiac rhythm was regular without murmur. There were no carotid bruits. Peripheral pulses were full and symmetric.   Respiratory:  Lungs were clear.   Abdomen:  Soft and nontender.   Spine:  Nontender.   Skin:  There were no rashes.   NEUROLOGICAL EXAMINATION:  Mental Status: Patient was alert and oriented. Speech was fluent. There was no dysarthria.   Cranial Nerves:   II: She could finger count bilaterally. Pupils were equal and reactive. Visual fields were full. Funduscopic examination was normal.   III, IV, VI: She was orthophoric on primary gaze on upgaze her right eye deviated laterally.  V: Facial sensation was intact.   VII: Facial strength was quite good.  There was fluctuating right upper lid ptosis.  VIII: Hearing was equal.   IX, X: Palatal movement was normal. Phonation was normal.   XI: Sternocleidomastoids and trapezii were normal.  There was prominent weakness of neck flexion and less so extension.  XII: Tongue was midline and movements normal. There was no lingual atrophy or fasciculations.    Motor Examination: Muscle bulk and tone were normal.  There was mild. diffuse limb weakness.  Sensory Examination: Pinprick, vibration and joint position sense were intact except for decreased pinprick on the right shin and foot dorsum.   Reflexes: DTRs were 2+ throughout.   Plantar Responses: Plantar responses were flexor.   Coordination/Cerebellar Function: There was no dysmetria on finger to nose testing.   Gait/Stance: Gait was stable.  Romberg was negative.

## 2024-02-14 NOTE — REVIEW OF SYSTEMS
[Arm Weakness] : arm weakness [Hand Weakness] :  hand weakness [Numbness] : numbness [Tingling] : tingling [Shortness Of Breath] : shortness of breath [Negative] : Heme/Lymph [de-identified] : Fluctuating diplopia and ptosis.

## 2024-02-14 NOTE — HISTORY OF PRESENT ILLNESS
[FreeTextEntry1] : Ms. Tiffany Valdivia returned to the office having been last seen on November 24, 2023.  She is a 43-year-old right-handed patient was evaluated by Dr. Griffin on September 1, 2021.  In December 2020, she experienced fluctuating diplopia and ptosis.  She had no bulbar or generalized weakness.  She was begun on high-dose prednisone by Dr. Alvarado in February 2021.  She was diagnosed with thymoma and underwent resection in May 2021.  She was experiencing right-sided neck pain with radiation to the right upper extremity with weakness and numbness.  MRI of the cervical spine revealed a right-sided disc herniation at C6-7 with severe compression of the C7 nerve root.  She was evaluated by an orthopedic surgeon who recommended surgery but she declined.  Her symptoms have abated.  She had mild numbness of her left fifth finger.  When seen in August 2022, she complained of increasing weakness and difficulty holding her head up.  She had weakness of her arms.  She complained of occasional numbness and tingling of her right arm.  Her breathing was a bit labored.  Her tongue felt swollen and her jaw fatigued when eating.  She complained of constant diplopia and fluctuating ptosis.  In response, her prednisone dose was increased from 10 to 20 mg/day and she continued pyridostigmine 60 mg 4 times a day.  She was begun on Vyvgart.    Her myasthenic symptoms improved significantly with her first and second cycles of Vyvgart.  Unfortunately, her third and fourth cycles, the latter completed yesterday had been less beneficial.  She complained of worsening diffuse weakness.  She had slurring of speech, fatigue when chewing, choking, shortness of breath with exertion, difficulty combing her hair and brushing her teeth, inability to rise from a seated position without using her arms, daily diplopia and ptosis.  MG ADL score on April 11, 2023 was 14, a seven-point increase compared to December. She was still experiencing rectal bleeding.  She had received blood transfusions last year and an iron infusion.  She was on 2 tablets of iron daily.  She is under the care of a gastroenterologist but has not yet undergone upper or lower endoscopies.  Recent blood work was notable for hemoglobin of 12.3 and ferritin of 47.  Following her April 2023 visit, she was vaccinated against meningitis A and B.  She received IVIG in May but this worsened her condition.  Several weeks before, she received her loading dose of Ultomiris followed by her first maintenance dose and 8 weeks later, her second maintenance dose.  She was scheduled for her next maintenance dose on January 6, 2024.  She increased her prednisone dose up to 40 mg/day and was currently on 20 mg/day, pyridostigmine 90 mg 4 times a day, Bactrim DS 3 times a week, probiotic, Ultomiris, vitamin D, zinc, vitamin C and iron.  MG ADL score was 13 on November 24, 2023.  She complained of slurred speech, fatigue when chewing, shortness of breath at rest, difficulty rising from a seated position, difficulty combing her hair and brushing her teeth and constant diplopia and ptosis.  She complained of numbness of her inner thigh and calf without back pain.  She had back pain in the past.  MRI of the lumbar spine revealed mild L4-5 foraminal stenosis due to herniated disc.  There was a left adnexal cyst.  Pelvic and transvaginal ultrasound was unremarkable.  She was evaluated by her gynecologist.  She is currently on extended release pyridostigmine 180 mg twice a day and pyridostigmine 30 mg 3 times a day, prednisone 20 mg/day, Ultomiris infusions every 8 weeks and Bactrim DS 3 times a week.  MGADL score was 8.  She complained of fatigue with chewing, requiring rest periods when brushing her hair, shortness of breath with exertion occasional diplopia and constant ptosis.  She reported persistent numbness of her right upper and lower extremities and generalized weakness.  She underwent upper endoscopy and colonoscopy which revealed a few polyps.  She is scheduled to see her gastroenterologist soon.  Past surgical history is notable for thymectomy, left knee procedures, tonsillectomy and breast reduction.  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  She has no drug allergies.  She is a former smoker.  She drinks socially.  She is .  She works as a .  Family history is notable for hyperlipidemia, hypertension and cancer.

## 2024-02-14 NOTE — ASSESSMENT
[FreeTextEntry1] : Ms. Valdivia is a 43-year-old with ocular and generalized myasthenia gravis.  Today's MG ADL score was 8 which was a 5 point improvement from her last visit.  She is currently on prednisone 20 mg daily, pyridostigmine, and Ultomiris.  I questioned her regarding the potential long-term side effects of steroid use.  I suggested addition of a steroid sparing agent either mycophenolate or azathioprine.  She will undergo updated blood tests and that decision will be made following review of those results.  She will follow-up with her gastroenterologist.  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  I requested a neuromuscular ultrasound of the right common peroneal nerve.   She will require a Meningitis B booster soon.  Further management will depend upon her clinical course.  MG ADL 8 MGFA IIb

## 2024-02-14 NOTE — PROCEDURE
[FreeTextEntry1] : Nerve conduction studies and electromyography [FreeTextEntry2] : Right upper and lower extremity numbness and 43-year-old with generalized myasthenia gravis [FreeTextEntry3] : Electrodiagnostic testing was performed in the right upper and both lower extremities.  The radial, median and ulnar sensory potentials were normal.  There was median slowing across the wrist segment.  The radial and ulnar conduction velocities were normal.  The median motor distal latency was prolonged but the compound muscle action potential and conduction velocity were normal.  The ulnar motor distal latency, compound muscle action potential and conduction velocity were normal.  The median and ulnar F waves were normal.  Repetitive stimulation studies in the right abductor digiti minimi revealed evidence of mild neuromuscular junction dysfunction.  The right sural and left superficial peroneal sensory potentials and conduction velocities were normal.  The right superficial peroneal sensory nerve was nonreactive.  The peroneal motor distal latencies were mildly prolonged.  The right peroneal compound muscle action potential was low in amplitude and the left was borderline.  Peroneal conduction velocities were mildly slow.  The right tibial motor distal latency, compound muscle action potential conduction velocity were normal.  The right tibial and both peroneal F waves were normal.  The right tibial H reflex was normal.  Needle electromyography was performed in several right lower extremity and lumbar paraspinal muscles.  There was mild spontaneous activity in the tibialis anterior only.  All of the muscles were silent at rest.  There was decreased recruitment and chronic motor unit reinnervation in the tibialis anterior and peroneus longus.  All other motor units and recruitment patterns were normal.  Conclusions: This was an abnormal study.  There was electrophysiologic evidence of a mild chronic common peroneal mononeuropathy at or proximal to the right fibular neck and distal to the innervation of the short head of the biceps femoris.  There was no evidence of right lumbar radiculopathy, sciatic or posterior tibial neuropathy or sensorimotor polyneuropathy.  There was electrophysiologic evidence of a mild median neuropathy at the right wrist.  There was electrophysiologic evidence of dysfunction of the neuromuscular junction.

## 2024-02-15 ENCOUNTER — NON-APPOINTMENT (OUTPATIENT)
Age: 44
End: 2024-02-15

## 2024-02-15 ENCOUNTER — TRANSCRIPTION ENCOUNTER (OUTPATIENT)
Age: 44
End: 2024-02-15

## 2024-02-15 LAB — IGA 24H UR QL IFE: NORMAL

## 2024-02-15 RX ORDER — MYCOPHENOLATE MOFETIL 500 MG/1
500 TABLET ORAL TWICE DAILY
Qty: 360 | Refills: 3 | Status: ACTIVE | COMMUNITY
Start: 2024-02-15 | End: 1900-01-01

## 2024-02-16 RX ORDER — EFGARTIGIMOD ALFA 20 MG/ML
400 INJECTION INTRAVENOUS
Qty: 8 | Refills: 6 | Status: COMPLETED | COMMUNITY
Start: 2022-09-21 | End: 2024-02-16

## 2024-02-16 RX ORDER — RAVULIZUMAB 1100 MG/11ML
1100 SOLUTION, CONCENTRATE INTRAVENOUS
Qty: 3 | Refills: 5 | Status: ACTIVE | COMMUNITY
Start: 2024-02-16 | End: 1900-01-01

## 2024-02-22 LAB
ALBUMIN MFR SERPL ELPH: 64.8 %
ALBUMIN SERPL-MCNC: 4 G/DL
ALBUMIN/GLOB SERPL: 1.8 RATIO
ALPHA1 GLOB MFR SERPL ELPH: 4.4 %
ALPHA1 GLOB SERPL ELPH-MCNC: 0.3 G/DL
ALPHA2 GLOB MFR SERPL ELPH: 10.9 %
ALPHA2 GLOB SERPL ELPH-MCNC: 0.7 G/DL
B-GLOBULIN MFR SERPL ELPH: 10.4 %
B-GLOBULIN SERPL ELPH-MCNC: 0.6 G/DL
DEPRECATED KAPPA LC FREE/LAMBDA SER: 1.12 RATIO
GAMMA GLOB FLD ELPH-MCNC: 0.6 G/DL
GAMMA GLOB MFR SERPL ELPH: 9.5 %
IGA SER QL IEP: 144 MG/DL
IGG SER QL IEP: 633 MG/DL
IGM SER QL IEP: 150 MG/DL
INTERPRETATION SERPL IEP-IMP: NORMAL
KAPPA LC CSF-MCNC: 0.99 MG/DL
KAPPA LC SERPL-MCNC: 1.11 MG/DL
M PROTEIN MFR SERPL ELPH: NORMAL
M PROTEIN SPEC IFE-MCNC: NORMAL
MONOCLON BAND OBS SERPL: NORMAL
PROT SERPL-MCNC: 6.2 G/DL
PROT SERPL-MCNC: 6.2 G/DL

## 2024-02-26 LAB
GENE STUDIED ID: NORMAL
LAB TEST METHOD: NORMAL
TEST PERFORMANCE INFO SPEC: NORMAL
TEST PERFORMANCE INFO SPEC: NORMAL
TTR GENE INTERPRETATION: NORMAL
TTR GENE RELEASED BY: NORMAL
TTR GENE RESULT SUMMARY: NEGATIVE
TTR GENE RESULT: NORMAL
TTR GENE SPECIMEN: NORMAL

## 2024-02-27 ENCOUNTER — TRANSCRIPTION ENCOUNTER (OUTPATIENT)
Age: 44
End: 2024-02-27

## 2024-03-07 ENCOUNTER — TRANSCRIPTION ENCOUNTER (OUTPATIENT)
Age: 44
End: 2024-03-07

## 2024-04-15 ENCOUNTER — TRANSCRIPTION ENCOUNTER (OUTPATIENT)
Age: 44
End: 2024-04-15

## 2024-04-15 RX ORDER — PREDNISONE 5 MG/1
5 TABLET ORAL DAILY
Qty: 360 | Refills: 3 | Status: ACTIVE | COMMUNITY
Start: 2024-04-15 | End: 1900-01-01

## 2024-04-15 RX ORDER — SULFAMETHOXAZOLE AND TRIMETHOPRIM 800; 160 MG/1; MG/1
800-160 TABLET ORAL DAILY
Qty: 36 | Refills: 3 | Status: ACTIVE | COMMUNITY
Start: 2024-04-15 | End: 1900-01-01

## 2024-04-16 ENCOUNTER — TRANSCRIPTION ENCOUNTER (OUTPATIENT)
Age: 44
End: 2024-04-16

## 2024-05-29 ENCOUNTER — TRANSCRIPTION ENCOUNTER (OUTPATIENT)
Age: 44
End: 2024-05-29

## 2024-05-31 ENCOUNTER — TRANSCRIPTION ENCOUNTER (OUTPATIENT)
Age: 44
End: 2024-05-31

## 2024-05-31 RX ORDER — PYRIDOSTIGMINE BROMIDE 60 MG/1
60 TABLET ORAL
Qty: 720 | Refills: 3 | Status: ACTIVE | COMMUNITY
Start: 2024-05-31 | End: 1900-01-01

## 2024-06-03 ENCOUNTER — TRANSCRIPTION ENCOUNTER (OUTPATIENT)
Age: 44
End: 2024-06-03

## 2024-06-19 ENCOUNTER — LABORATORY RESULT (OUTPATIENT)
Age: 44
End: 2024-06-19

## 2024-06-19 ENCOUNTER — NON-APPOINTMENT (OUTPATIENT)
Age: 44
End: 2024-06-19

## 2024-06-19 ENCOUNTER — APPOINTMENT (OUTPATIENT)
Dept: NEUROLOGY | Facility: CLINIC | Age: 44
End: 2024-06-19
Payer: COMMERCIAL

## 2024-06-19 VITALS
HEART RATE: 67 BPM | WEIGHT: 144 LBS | HEIGHT: 65 IN | DIASTOLIC BLOOD PRESSURE: 79 MMHG | BODY MASS INDEX: 23.99 KG/M2 | SYSTOLIC BLOOD PRESSURE: 129 MMHG

## 2024-06-19 DIAGNOSIS — D47.2 MONOCLONAL GAMMOPATHY: ICD-10-CM

## 2024-06-19 DIAGNOSIS — G70.00 MYASTHENIA GRAVIS W/OUT (ACUTE) EXACERBATION: ICD-10-CM

## 2024-06-19 DIAGNOSIS — D64.9 ANEMIA, UNSPECIFIED: ICD-10-CM

## 2024-06-19 LAB
25(OH)D3 SERPL-MCNC: 29.2 NG/ML
ALBUMIN SERPL ELPH-MCNC: 4.1 G/DL
ALP BLD-CCNC: 46 U/L
ALT SERPL-CCNC: 14 U/L
ANION GAP SERPL CALC-SCNC: 12 MMOL/L
AST SERPL-CCNC: 12 U/L
BILIRUB SERPL-MCNC: 0.2 MG/DL
BUN SERPL-MCNC: 13 MG/DL
CALCIUM SERPL-MCNC: 9 MG/DL
CHLORIDE SERPL-SCNC: 108 MMOL/L
CO2 SERPL-SCNC: 21 MMOL/L
CREAT SERPL-MCNC: 0.58 MG/DL
EGFR: 113 ML/MIN/1.73M2
GLUCOSE SERPL-MCNC: 102 MG/DL
POTASSIUM SERPL-SCNC: 3.8 MMOL/L
PROT SERPL-MCNC: 5.8 G/DL
SODIUM SERPL-SCNC: 141 MMOL/L

## 2024-06-19 PROCEDURE — 99214 OFFICE O/P EST MOD 30 MIN: CPT

## 2024-06-19 RX ORDER — PENICILLIN V POTASSIUM 500 MG/1
500 TABLET, FILM COATED ORAL
Qty: 180 | Refills: 3 | Status: ACTIVE | COMMUNITY
Start: 2024-06-19 | End: 1900-01-01

## 2024-06-19 NOTE — PHYSICAL EXAM
[FreeTextEntry1] : Constitutional:  Patient was well-developed, well-nourished and in no acute distress.   Head:  Normocephalic, atraumatic. Tympanic membranes were not examined.   Neck:  Supple with full range of motion.   Cardiovascular:  Cardiac rhythm was regular without murmur. There were no carotid bruits. Peripheral pulses were full and symmetric.   Respiratory:  Lungs were clear.   Abdomen:  Soft and nontender.   Spine:  Nontender.   Skin:  There were no rashes.   NEUROLOGICAL EXAMINATION:  Mental Status: Patient was alert and oriented. Speech was fluent. There was no dysarthria.   Cranial Nerves:   II: Visual acuity was 20/30 in the right eye and 20/20 left eye without correction. Pupils were equal and reactive. Visual fields were full. Funduscopic examination was normal.   III, IV, VI: Eye movements were full without nystagmus.  There was mild right upper lid ptosis.  V: Facial sensation was intact.   VII: Facial strength was quite good.   VIII: Hearing was equal.   IX, X: Palatal movement was normal. Phonation was normal.   XI: Sternocleidomastoids and trapezii were normal.  There was prominent weakness of neck flexion and less so extension.  XII: Tongue was midline and movements normal. There was no lingual atrophy or fasciculations.    Motor Examination: Muscle bulk and tone were normal.  There was mild. diffuse limb weakness.  Sensory Examination: Pinprick, vibration and joint position sense were intact except for decreased pinprick on the right shin and foot dorsum.   Reflexes: DTRs were 2+ throughout.   Plantar Responses: Plantar responses were flexor.   Coordination/Cerebellar Function: There was no dysmetria on finger to nose testing.   Gait/Stance: Gait was stable.  Romberg was negative.

## 2024-06-19 NOTE — HISTORY OF PRESENT ILLNESS
[FreeTextEntry1] : Ms. Tiffany Valdivia returned to the office having been last seen on February 14, 2024.  She is a 44-year-old right-handed patient was evaluated by Dr. Griffin on September 1, 2021.  In December 2020, she experienced fluctuating diplopia and ptosis.  She had no bulbar or generalized weakness.  She was begun on high-dose prednisone by Dr. Alvarado in February 2021.  She was diagnosed with thymoma and underwent resection in May 2021.  She was experiencing right-sided neck pain with radiation to the right upper extremity with weakness and numbness.  MRI of the cervical spine revealed a right-sided disc herniation at C6-7 with severe compression of the C7 nerve root.  She was evaluated by an orthopedic surgeon who recommended surgery but she declined.  Her symptoms have abated.  She had mild numbness of her left fifth finger.  When seen in August 2022, she complained of increasing weakness and difficulty holding her head up.  She had weakness of her arms.  She complained of occasional numbness and tingling of her right arm.  Her breathing was a bit labored.  Her tongue felt swollen and her jaw fatigued when eating.  She complained of constant diplopia and fluctuating ptosis.  In response, her prednisone dose was increased from 10 to 20 mg/day and she continued pyridostigmine 60 mg 4 times a day.  She was begun on Vyvgart.    Her myasthenic symptoms improved significantly with her first and second cycles of Vyvgart.  Unfortunately, her third and fourth cycles, the latter completed yesterday had been less beneficial.  She complained of worsening diffuse weakness.  She had slurring of speech, fatigue when chewing, choking, shortness of breath with exertion, difficulty combing her hair and brushing her teeth, inability to rise from a seated position without using her arms, daily diplopia and ptosis.  MG ADL score on April 11, 2023 was 14, a seven-point increase compared to December. She was still experiencing rectal bleeding.  She had received blood transfusions last year and an iron infusion.  She was on 2 tablets of iron daily.  She is under the care of a gastroenterologist but has not yet undergone upper or lower endoscopies.  Recent blood work was notable for hemoglobin of 12.3 and ferritin of 47.  Following her April 2023 visit, she was vaccinated against meningitis A and B.  She received IVIG in May but this worsened her condition.  Several weeks before, she received her loading dose of Ultomiris followed by her first maintenance dose and 8 weeks later, her second maintenance dose.  She was scheduled for her next maintenance dose on January 6, 2024.  She increased her prednisone dose up to 40 mg/day and was currently on 20 mg/day, pyridostigmine 90 mg 4 times a day, Bactrim DS 3 times a week, probiotic, Ultomiris, vitamin D, zinc, vitamin C and iron.  MG ADL score was 13 on November 24, 2023.  She complained of slurred speech, fatigue when chewing, shortness of breath at rest, difficulty rising from a seated position, difficulty combing her hair and brushing her teeth and constant diplopia and ptosis.  She complained of numbness of her inner thigh and calf without back pain.  She had back pain in the past.  MRI of the lumbar spine revealed mild L4-5 foraminal stenosis due to herniated disc.  There was a left adnexal cyst.  Pelvic and transvaginal ultrasound was unremarkable.  She was evaluated by her gynecologist.  At her February 2024 visit, she wason extended release pyridostigmine 180 mg twice a day and pyridostigmine 30 mg 3 times a day, prednisone 20 mg/day, Ultomiris infusions every 8 weeks and Bactrim DS 3 times a week.  MGADL score was 8.  She complained of fatigue with chewing, requiring rest periods when brushing her hair, shortness of breath with exertion occasional diplopia and constant ptosis.  She reported persistent numbness of her right upper and lower extremities and generalized weakness.  She underwent upper endoscopy and colonoscopy which revealed a few polyps.  She was scheduled to see her gastroenterologist soon.  At today's visit, she remains on prednisone 20 mg/day, pyridostigmine  mg twice a day and IR 30 mg 3 times a day, Ultomiris infusions every 8 weeks and Bactrim double strength on Mondays, Wednesdays and Fridays.  She has been feeling very well for 2 months with only complaint being right lid drooping.  She experienced a sparkling light in her left eye several days ago which resolved.  She still has numbness of her right foot.  Since last seen, she was treated with mycophenolate but experienced severe side effects prompting its discontinuation.  She has not yet received her Bexsero booster. She recently underwent laser treatment of hemorrhoids.  Blood tests performed in February were notable for weak IgG kappa and IgM kappa light chains.  Acetylcholine receptor antibodies were markedly positive.  Past surgical history is notable for thymectomy, left knee procedures, tonsillectomy and breast reduction.  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  She has no drug allergies.  She is a former smoker.  She drinks socially.  She is .  She works as a .  Family history is notable for hyperlipidemia, hypertension and cancer.

## 2024-06-19 NOTE — ASSESSMENT
[FreeTextEntry1] : Ms. Valdivia is a 44-year-old with ocular and generalized myasthenia gravis status post thymectomy for thymoma.  Today's MG ADL score was 5.  She is feeling quite well.  She is currently on prednisone 20 mg daily, pyridostigmine, and Ultomiris.  She was intolerant to mycophenolate.  She requires a steroid sparing agent.  She will undergo a Prometheus study and be started on azathioprine.  Approval for IVIG treatment was obtained but she has been unable to receive the medication because of her tight work schedule.  I will email Barifreya regarding her Bexsero booster.  I had a long discussion with her regarding meningitis prophylaxis.  She agrees to prophylactic treatment with penicillin V 500 mg twice a day.  She will continue double strength Bactrim 3 times a week for PCP prophylaxis as long as her prednisone dose is at or over 20 mg daily. When I speak to her about today's blood test results, I will suggest a small decrease in her prednisone dose.  I suggested ophthalmology consultation.  She will return to the office in 3 to 4 months for routine follow-up.  Telephone contact will be maintained in the meantime.  MG ADL 5 MGFA IIb

## 2024-06-19 NOTE — REVIEW OF SYSTEMS
[Arm Weakness] : arm weakness [Hand Weakness] :  hand weakness [Numbness] : numbness [Tingling] : tingling [Shortness Of Breath] : shortness of breath [Negative] : Heme/Lymph [de-identified] : Fluctuating diplopia and ptosis.

## 2024-06-20 ENCOUNTER — TRANSCRIPTION ENCOUNTER (OUTPATIENT)
Age: 44
End: 2024-06-20

## 2024-06-20 LAB
BASOPHILS # BLD AUTO: 0.07 K/UL
BASOPHILS NFR BLD AUTO: 0.9 %
EOSINOPHIL # BLD AUTO: 0 K/UL
EOSINOPHIL NFR BLD AUTO: 0 %
ESTIMATED AVERAGE GLUCOSE: 74 MG/DL
HBA1C MFR BLD HPLC: 4.2 %
HCT VFR BLD CALC: 28.9 %
HGB BLD-MCNC: 8 G/DL
IGA 24H UR QL IFE: NORMAL
LYMPHOCYTES # BLD AUTO: 0.71 K/UL
LYMPHOCYTES NFR BLD AUTO: 9.6 %
MAN DIFF?: NORMAL
MCHC RBC-ENTMCNC: 27.7 GM/DL
MCHC RBC-ENTMCNC: 29.9 PG
MCV RBC AUTO: 107.8 FL
MONOCYTES # BLD AUTO: 0.39 K/UL
MONOCYTES NFR BLD AUTO: 5.3 %
NEUTROPHILS # BLD AUTO: 6.23 K/UL
NEUTROPHILS NFR BLD AUTO: 84.2 %
PLATELET # BLD AUTO: 296 K/UL
RBC # BLD: 2.68 M/UL
RBC # FLD: 16.1 %
WBC # FLD AUTO: 7.4 K/UL

## 2024-06-21 ENCOUNTER — TRANSCRIPTION ENCOUNTER (OUTPATIENT)
Age: 44
End: 2024-06-21

## 2024-06-21 LAB
GALACTOSE, ALPHA (O215) CONC: >100 KUA/L
GALACTOSE-ALPHA-1,3-GALACTOSE IGE: 6 (ref 0–?)

## 2024-06-22 LAB
A PHAGOCYTOPH IGG TITR SER IF: NORMAL
B BURGDOR AB SER QL IA: 0.21 IV
B MICROTI IGG TITR SER: NORMAL
E CHAFFEENSIS IGG TITR SER IF: NORMAL

## 2024-06-24 LAB
ALBUMIN MFR SERPL ELPH: 65.4 %
ALBUMIN SERPL-MCNC: 3.8 G/DL
ALBUMIN/GLOB SERPL: 1.9 RATIO
ALPHA1 GLOB MFR SERPL ELPH: 5.3 %
ALPHA1 GLOB SERPL ELPH-MCNC: 0.3 G/DL
ALPHA2 GLOB MFR SERPL ELPH: 10.9 %
ALPHA2 GLOB SERPL ELPH-MCNC: 0.6 G/DL
B-GLOBULIN MFR SERPL ELPH: 10.6 %
B-GLOBULIN SERPL ELPH-MCNC: 0.6 G/DL
DEPRECATED KAPPA LC FREE/LAMBDA SER: 0.84 RATIO
GAMMA GLOB FLD ELPH-MCNC: 0.5 G/DL
GAMMA GLOB MFR SERPL ELPH: 7.8 %
IGA SER QL IEP: 111 MG/DL
IGG SER QL IEP: 412 MG/DL
IGM SER QL IEP: 110 MG/DL
INTERPRETATION SERPL IEP-IMP: NORMAL
KAPPA LC CSF-MCNC: 1.17 MG/DL
KAPPA LC SERPL-MCNC: 0.98 MG/DL
M PROTEIN MFR SERPL ELPH: NORMAL
M PROTEIN SPEC IFE-MCNC: NORMAL
MONOCLON BAND OBS SERPL: NORMAL
PROT SERPL-MCNC: 5.8 G/DL
PROT SERPL-MCNC: 5.8 G/DL

## 2024-06-28 PROBLEM — D47.2 MONOCLONAL GAMMOPATHY: Status: ACTIVE | Noted: 2024-06-28

## 2024-06-28 PROBLEM — D64.9 ANEMIA: Status: ACTIVE | Noted: 2023-02-21

## 2024-06-28 LAB
TPMT ENZYME INTERPRETATION: NORMAL
TPMT ENZYME METHODOLOGY: NORMAL
TPMT ENZYME: 47.8

## 2024-07-08 ENCOUNTER — NON-APPOINTMENT (OUTPATIENT)
Age: 44
End: 2024-07-08

## 2024-08-14 RX ORDER — RAVULIZUMAB 1100 MG/11ML
1100 SOLUTION, CONCENTRATE INTRAVENOUS
Qty: 3 | Refills: 5 | Status: ACTIVE | COMMUNITY
Start: 2024-08-14 | End: 1900-01-01

## 2024-10-01 ENCOUNTER — NON-APPOINTMENT (OUTPATIENT)
Age: 44
End: 2024-10-01

## 2024-10-02 ENCOUNTER — NON-APPOINTMENT (OUTPATIENT)
Age: 44
End: 2024-10-02

## 2024-10-02 ENCOUNTER — APPOINTMENT (OUTPATIENT)
Dept: NEUROLOGY | Facility: CLINIC | Age: 44
End: 2024-10-02
Payer: COMMERCIAL

## 2024-10-02 ENCOUNTER — LABORATORY RESULT (OUTPATIENT)
Age: 44
End: 2024-10-02

## 2024-10-02 VITALS
SYSTOLIC BLOOD PRESSURE: 139 MMHG | WEIGHT: 165 LBS | HEIGHT: 65 IN | BODY MASS INDEX: 27.49 KG/M2 | DIASTOLIC BLOOD PRESSURE: 77 MMHG | HEART RATE: 76 BPM

## 2024-10-02 DIAGNOSIS — G70.00 MYASTHENIA GRAVIS W/OUT (ACUTE) EXACERBATION: ICD-10-CM

## 2024-10-02 DIAGNOSIS — D64.9 ANEMIA, UNSPECIFIED: ICD-10-CM

## 2024-10-02 LAB
ALBUMIN SERPL ELPH-MCNC: 3.8 G/DL
ALP BLD-CCNC: 46 U/L
ALT SERPL-CCNC: 17 U/L
ANION GAP SERPL CALC-SCNC: 13 MMOL/L
APPEARANCE: CLEAR
AST SERPL-CCNC: 14 U/L
BACTERIA: NEGATIVE /HPF
BASOPHILS # BLD AUTO: 0.01 K/UL
BASOPHILS NFR BLD AUTO: 0.2 %
BILIRUB SERPL-MCNC: 0.2 MG/DL
BILIRUBIN URINE: NEGATIVE
BLOOD URINE: NEGATIVE
BUN SERPL-MCNC: 17 MG/DL
CALCIUM SERPL-MCNC: 8.4 MG/DL
CAST: 0 /LPF
CHLORIDE SERPL-SCNC: 106 MMOL/L
CO2 SERPL-SCNC: 21 MMOL/L
COLOR: YELLOW
CREAT SERPL-MCNC: 0.68 MG/DL
EGFR: 109 ML/MIN/1.73M2
EOSINOPHIL # BLD AUTO: 0.03 K/UL
EOSINOPHIL NFR BLD AUTO: 0.5 %
EPITHELIAL CELLS: 1 /HPF
FERRITIN SERPL-MCNC: 72 NG/ML
GLUCOSE QUALITATIVE U: NEGATIVE MG/DL
GLUCOSE SERPL-MCNC: 95 MG/DL
HCT VFR BLD CALC: 23.9 %
HGB BLD-MCNC: 6.5 G/DL
IMM GRANULOCYTES NFR BLD AUTO: 1.4 %
IRON SATN MFR SERPL: 77 %
IRON SERPL-MCNC: 235 UG/DL
KETONES URINE: NEGATIVE MG/DL
LEUKOCYTE ESTERASE URINE: NEGATIVE
LYMPHOCYTES # BLD AUTO: 0.69 K/UL
LYMPHOCYTES NFR BLD AUTO: 10.7 %
MAN DIFF?: NORMAL
MCHC RBC-ENTMCNC: 27.2 GM/DL
MCHC RBC-ENTMCNC: 30.1 PG
MCV RBC AUTO: 110.6 FL
MICROSCOPIC-UA: NORMAL
MONOCYTES # BLD AUTO: 0.49 K/UL
MONOCYTES NFR BLD AUTO: 7.6 %
NEUTROPHILS # BLD AUTO: 5.11 K/UL
NEUTROPHILS NFR BLD AUTO: 79.6 %
NITRITE URINE: NEGATIVE
NT-PROBNP SERPL-MCNC: 110 PG/ML
PH URINE: 6
PLATELET # BLD AUTO: 297 K/UL
POTASSIUM SERPL-SCNC: 4 MMOL/L
PROT SERPL-MCNC: 5.3 G/DL
PROTEIN URINE: NEGATIVE MG/DL
RBC # BLD: 2.16 M/UL
RBC # FLD: 16.3 %
RED BLOOD CELLS URINE: 2 /HPF
SODIUM SERPL-SCNC: 140 MMOL/L
SPECIFIC GRAVITY URINE: 1.01
TIBC SERPL-MCNC: 305 UG/DL
TSH SERPL-ACNC: 2.72 UIU/ML
UIBC SERPL-MCNC: 70 UG/DL
UROBILINOGEN URINE: 0.2 MG/DL
WBC # FLD AUTO: 6.42 K/UL
WHITE BLOOD CELLS URINE: 0 /HPF

## 2024-10-02 PROCEDURE — 99213 OFFICE O/P EST LOW 20 MIN: CPT

## 2024-10-02 NOTE — HISTORY OF PRESENT ILLNESS
[FreeTextEntry1] : Ms. Tiffany Valdivia returned to the office having been last seen on June 19, 2024.  She is a 44-year-old right-handed patient was evaluated by Dr. Griffin on September 1, 2021.  In December 2020, she experienced fluctuating diplopia and ptosis.  She had no bulbar or generalized weakness.  She was begun on high-dose prednisone by Dr. Alvarado in February 2021.  She was diagnosed with thymoma and underwent resection in May 2021.  She was experiencing right-sided neck pain with radiation to the right upper extremity with weakness and numbness.  MRI of the cervical spine revealed a right-sided disc herniation at C6-7 with severe compression of the C7 nerve root.  She was evaluated by an orthopedic surgeon who recommended surgery but she declined.  Her symptoms have abated.  She had mild numbness of her left fifth finger.  When seen in August 2022, she complained of increasing weakness and difficulty holding her head up.  She had weakness of her arms.  She complained of occasional numbness and tingling of her right arm.  Her breathing was a bit labored.  Her tongue felt swollen and her jaw fatigued when eating.  She complained of constant diplopia and fluctuating ptosis.  In response, her prednisone dose was increased from 10 to 20 mg/day and she continued pyridostigmine 60 mg 4 times a day.  She was begun on Vyvgart.    Her myasthenic symptoms improved significantly with her first and second cycles of Vyvgart.  Unfortunately, her third and fourth cycles, the latter completed yesterday had been less beneficial.  She complained of worsening diffuse weakness.  She had slurring of speech, fatigue when chewing, choking, shortness of breath with exertion, difficulty combing her hair and brushing her teeth, inability to rise from a seated position without using her arms, daily diplopia and ptosis.  MG ADL score on April 11, 2023 was 14, a seven-point increase compared to December. She was still experiencing rectal bleeding.  She had received blood transfusions last year and an iron infusion.  She was on 2 tablets of iron daily.  She is under the care of a gastroenterologist but has not yet undergone upper or lower endoscopies.  Recent blood work was notable for hemoglobin of 12.3 and ferritin of 47.  Following her April 2023 visit, she was vaccinated against meningitis A and B.  She received IVIG in May but this worsened her condition.  Several weeks before, she received her loading dose of Ultomiris followed by her first maintenance dose and 8 weeks later, her second maintenance dose.  She was scheduled for her next maintenance dose on January 6, 2024.  She increased her prednisone dose up to 40 mg/day and was currently on 20 mg/day, pyridostigmine 90 mg 4 times a day, Bactrim DS 3 times a week, probiotic, Ultomiris, vitamin D, zinc, vitamin C and iron.  MG ADL score was 13 on November 24, 2023.  She complained of slurred speech, fatigue when chewing, shortness of breath at rest, difficulty rising from a seated position, difficulty combing her hair and brushing her teeth and constant diplopia and ptosis.  She complained of numbness of her inner thigh and calf without back pain.  She had back pain in the past.  MRI of the lumbar spine revealed mild L4-5 foraminal stenosis due to herniated disc.  There was a left adnexal cyst.  Pelvic and transvaginal ultrasound was unremarkable.  She was evaluated by her gynecologist.  At her February 2024 visit, she wason extended release pyridostigmine 180 mg twice a day and pyridostigmine 30 mg 3 times a day, prednisone 20 mg/day, Ultomiris infusions every 8 weeks and Bactrim DS 3 times a week.  MGADL score was 8.  She complained of fatigue with chewing, requiring rest periods when brushing her hair, shortness of breath with exertion occasional diplopia and constant ptosis.  She reported persistent numbness of her right upper and lower extremities and generalized weakness.  She underwent upper endoscopy and colonoscopy which revealed a few polyps.  She was scheduled to see her gastroenterologist soon.  At her June 2024 visit, she remained on prednisone 20 mg/day, pyridostigmine  mg twice a day and IR 30 mg 3 times a day, Ultomiris infusions every 8 weeks and Bactrim double strength on Mondays, Wednesdays and Fridays.  She had been feeling very well for 2 months with only complaint being right lid drooping.  She experienced a sparkling light in her left eye several days ago which resolved.  She still had numbness of her right foot.  Since her February 2024 visit, she was treated with mycophenolate but experienced severe side effects prompting its discontinuation.  She had not yet received her Bexsero booster. She recently underwent laser treatment of hemorrhoids.  Blood tests performed in February 2024 were notable for weak IgG kappa and IgM kappa light chains.  Acetylcholine receptor antibodies were markedly positive.  Blood tests performed in June 2024 were notable for hemoglobin of 8, .8 and platelet count of 296.  White count was 7.4.  Urine protein was 15 mg/dL.  Serum immunofixation revealed 1 weak beta migrating in 1 weak gamma migrating band.  There was an IgG kappa band and 1 weak IgM kappa band.  Urine immunofixation revealed no monoclonal band.  Prometheus panel was normal.  She was evaluated by a gastroenterologist because of persistent rectal bleeding.  She was diagnosed with hemorrhoids.  She underwent 3 laser procedures in early summer with relief.  Her bleeding recurred but her insurance carrier declined further treatments.  She did not follow-up with her gastroenterologist, internist or seek a hematology consultation as suggested.  Her MG ADL score today was 8.  She complains of bilateral pedal swelling.  She requested Lasix.  She is currently on Ultomiris infusions every 8 weeks, Bactrim DS 3 times a week, penicillin V 500 mg twice a day, prednisone 20 mg/day, extended release pyridostigmine to 180 mg twice a day and immediate release pyridostigmine 30 mg 3 times a day.  She takes daily iron supplements, vitamin C, vitamin D3, zinc and biotin.  Past surgical history is notable for thymectomy, left knee procedures, tonsillectomy and breast reduction.  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  She has no drug allergies.  She is a former smoker.  She drinks socially.  She is .  She works as a .  Family history is notable for hyperlipidemia, hypertension and cancer.

## 2024-10-02 NOTE — PHYSICAL EXAM
[FreeTextEntry1] : Constitutional:  Patient was well-developed, well-nourished and in no acute distress.   Head:  Normocephalic, atraumatic. Tympanic membranes were not examined.   Neck:  Supple with full range of motion.   Cardiovascular:  Cardiac rhythm was regular without murmur. There were no carotid bruits. Peripheral pulses were full and symmetric.  There was bilateral pedal and pretibial edema.  Respiratory:  Lungs were clear.   Abdomen:  Soft and nontender.   Spine:  Nontender.   Skin:  There were no rashes.   NEUROLOGICAL EXAMINATION:  Mental Status: Patient was alert and oriented. Speech was fluent. There was no dysarthria.   Cranial Nerves:   II: She could finger count bilaterally. Pupils were equal and reactive. Visual fields were full.  Fundi were not examined.  III, IV, VI: Eye movements were full without nystagmus.  There was mild right upper lid ptosis.  V: Facial sensation was intact.   VII: Facial strength was quite good.  There is no significant eye closure weakness.  VIII: Hearing was equal.   IX, X: Palatal movement was normal. Phonation was normal.   XI: Sternocleidomastoids and trapezii were normal.  Neck flexion and extension strength was quite good.  XII: Tongue was midline and movements normal. There was no lingual atrophy or fasciculations.    Motor Examination: Muscle bulk and tone were normal.  There was mild. diffuse limb weakness.  Sensory Examination: Vibration was diminished in the feet but intact joint position sense.  There was shading of pinprick in a stocking distribution.  Reflexes: DTRs were 2+ throughout.   Plantar Responses: Plantar responses were flexor.   Coordination/Cerebellar Function: There was no dysmetria on finger to nose testing.   Gait/Stance: Gait was stable.  Romberg was negative.

## 2024-10-02 NOTE — REVIEW OF SYSTEMS
[Arm Weakness] : arm weakness [Hand Weakness] :  hand weakness [Numbness] : numbness [Tingling] : tingling [Shortness Of Breath] : shortness of breath [Negative] : Heme/Lymph [de-identified] : Fluctuating diplopia and ptosis.

## 2024-10-02 NOTE — ASSESSMENT
[FreeTextEntry1] : Ms. Valdivia is a 44-year-old with ocular and generalized myasthenia gravis status post thymectomy for thymoma. Despite an MG ADL score of 8, she seems to be doing relatively well from a myasthenic standpoint.  I expressed my concerns regarding her long-term use of high-dose steroids and steroid side effects.  I also raised concern regarding her ongoing rectal bleeding and absence of appropriate follow-up.  She also requires hematology evaluation given her iron deficiency and monoclonal gammopathy.  I will continue her current regimen of Ultomiris, prednisone and pyridostigmine.  I will obtain updated blood tests including CBC upon leaving my office.   Once hematology consultation is obtained, further discussions can be held regarding treatment with a steroid sparing agent.  A general medical evaluation is encouraged as well as gastroenterology follow-up.  Further management will depend upon her clinical course.  MG ADL 8 MGFA IIa   Meningitis B booster administered 7/3/2024 Meningitis A due 2028

## 2024-10-02 NOTE — REVIEW OF SYSTEMS
yes [Arm Weakness] : arm weakness [Hand Weakness] :  hand weakness [Numbness] : numbness [Tingling] : tingling [Shortness Of Breath] : shortness of breath [Negative] : Heme/Lymph [de-identified] : Fluctuating diplopia and ptosis.

## 2024-10-03 ENCOUNTER — TRANSCRIPTION ENCOUNTER (OUTPATIENT)
Age: 44
End: 2024-10-03

## 2024-10-03 ENCOUNTER — EMERGENCY (EMERGENCY)
Facility: HOSPITAL | Age: 44
LOS: 1 days | Discharge: HOME | End: 2024-10-03
Attending: STUDENT IN AN ORGANIZED HEALTH CARE EDUCATION/TRAINING PROGRAM
Payer: COMMERCIAL

## 2024-10-03 VITALS
OXYGEN SATURATION: 100 % | WEIGHT: 169.98 LBS | DIASTOLIC BLOOD PRESSURE: 77 MMHG | HEIGHT: 65 IN | SYSTOLIC BLOOD PRESSURE: 124 MMHG | HEART RATE: 82 BPM | TEMPERATURE: 98 F | RESPIRATION RATE: 18 BRPM

## 2024-10-03 VITALS
TEMPERATURE: 98 F | OXYGEN SATURATION: 99 % | HEART RATE: 78 BPM | SYSTOLIC BLOOD PRESSURE: 122 MMHG | DIASTOLIC BLOOD PRESSURE: 71 MMHG | RESPIRATION RATE: 18 BRPM

## 2024-10-03 DIAGNOSIS — Z98.890 OTHER SPECIFIED POSTPROCEDURAL STATES: Chronic | ICD-10-CM

## 2024-10-03 DIAGNOSIS — Z90.89 ACQUIRED ABSENCE OF OTHER ORGANS: Chronic | ICD-10-CM

## 2024-10-03 LAB
ALBUMIN SERPL ELPH-MCNC: 4 G/DL — SIGNIFICANT CHANGE UP (ref 3.3–5)
ALP SERPL-CCNC: 49 U/L — SIGNIFICANT CHANGE UP (ref 40–120)
ALT FLD-CCNC: 21 U/L — SIGNIFICANT CHANGE UP (ref 10–45)
ANION GAP SERPL CALC-SCNC: 12 MMOL/L — SIGNIFICANT CHANGE UP (ref 5–17)
ANISOCYTOSIS BLD QL: SLIGHT — SIGNIFICANT CHANGE UP
APTT BLD: 24.3 SEC — LOW (ref 24.5–35.6)
AST SERPL-CCNC: 16 U/L — SIGNIFICANT CHANGE UP (ref 10–40)
BASOPHILS # BLD AUTO: 0 K/UL — SIGNIFICANT CHANGE UP (ref 0–0.2)
BASOPHILS NFR BLD AUTO: 0 % — SIGNIFICANT CHANGE UP (ref 0–2)
BILIRUB SERPL-MCNC: 0.2 MG/DL — SIGNIFICANT CHANGE UP (ref 0.2–1.2)
BUN SERPL-MCNC: 13 MG/DL — SIGNIFICANT CHANGE UP (ref 7–23)
CALCIUM SERPL-MCNC: 9.1 MG/DL — SIGNIFICANT CHANGE UP (ref 8.4–10.5)
CHLORIDE SERPL-SCNC: 106 MMOL/L — SIGNIFICANT CHANGE UP (ref 96–108)
CO2 SERPL-SCNC: 21 MMOL/L — LOW (ref 22–31)
CREAT SERPL-MCNC: 0.6 MG/DL — SIGNIFICANT CHANGE UP (ref 0.5–1.3)
DACRYOCYTES BLD QL SMEAR: SLIGHT — SIGNIFICANT CHANGE UP
EGFR: 112 ML/MIN/1.73M2 — SIGNIFICANT CHANGE UP
EGFR: 112 ML/MIN/1.73M2 — SIGNIFICANT CHANGE UP
ELLIPTOCYTES BLD QL SMEAR: SLIGHT — SIGNIFICANT CHANGE UP
EOSINOPHIL # BLD AUTO: 0 K/UL — SIGNIFICANT CHANGE UP (ref 0–0.5)
EOSINOPHIL NFR BLD AUTO: 0 % — SIGNIFICANT CHANGE UP (ref 0–6)
FERRITIN SERPL-MCNC: 70 NG/ML — SIGNIFICANT CHANGE UP (ref 15–150)
GLUCOSE SERPL-MCNC: 106 MG/DL — HIGH (ref 70–99)
HCT VFR BLD CALC: 26.2 % — LOW (ref 34.5–45)
HGB BLD-MCNC: 6.8 G/DL — CRITICAL LOW (ref 11.5–15.5)
INR BLD: 0.9 RATIO — SIGNIFICANT CHANGE UP (ref 0.85–1.16)
IRON SATN MFR SERPL: 21 UG/DL — LOW (ref 30–160)
IRON SATN MFR SERPL: 6 % — LOW (ref 14–50)
LYMPHOCYTES # BLD AUTO: 0.78 K/UL — LOW (ref 1–3.3)
LYMPHOCYTES # BLD AUTO: 9.6 % — LOW (ref 13–44)
MACROCYTES BLD QL: SLIGHT — SIGNIFICANT CHANGE UP
MANUAL SMEAR VERIFICATION: SIGNIFICANT CHANGE UP
MCHC RBC-ENTMCNC: 26 GM/DL — LOW (ref 32–36)
MCHC RBC-ENTMCNC: 29.3 PG — SIGNIFICANT CHANGE UP (ref 27–34)
MCV RBC AUTO: 112.9 FL — HIGH (ref 80–100)
MONOCYTES # BLD AUTO: 0.35 K/UL — SIGNIFICANT CHANGE UP (ref 0–0.9)
MONOCYTES NFR BLD AUTO: 4.3 % — SIGNIFICANT CHANGE UP (ref 2–14)
NEUTROPHILS # BLD AUTO: 6.93 K/UL — SIGNIFICANT CHANGE UP (ref 1.8–7.4)
NEUTROPHILS NFR BLD AUTO: 85.2 % — HIGH (ref 43–77)
PLAT MORPH BLD: NORMAL — SIGNIFICANT CHANGE UP
PLATELET # BLD AUTO: 343 K/UL — SIGNIFICANT CHANGE UP (ref 150–400)
POIKILOCYTOSIS BLD QL AUTO: SLIGHT — SIGNIFICANT CHANGE UP
POLYCHROMASIA BLD QL SMEAR: SLIGHT — SIGNIFICANT CHANGE UP
POTASSIUM SERPL-MCNC: 3.4 MMOL/L — LOW (ref 3.5–5.3)
POTASSIUM SERPL-SCNC: 3.4 MMOL/L — LOW (ref 3.5–5.3)
PROT SERPL-MCNC: 6.1 G/DL — SIGNIFICANT CHANGE UP (ref 6–8.3)
PROTHROM AB SERPL-ACNC: 10.4 SEC — SIGNIFICANT CHANGE UP (ref 9.9–13.4)
RBC # BLD: 2.32 M/UL — LOW (ref 3.8–5.2)
RBC # BLD: 2.32 M/UL — LOW (ref 3.8–5.2)
RBC # FLD: 16.5 % — HIGH (ref 10.3–14.5)
RBC BLD AUTO: ABNORMAL
RETICS #: 329.7 K/UL — HIGH (ref 25–125)
RETICS/RBC NFR: 14.2 % — HIGH (ref 0.5–2.5)
SODIUM SERPL-SCNC: 139 MMOL/L — SIGNIFICANT CHANGE UP (ref 135–145)
TIBC SERPL-MCNC: 333 UG/DL — SIGNIFICANT CHANGE UP (ref 220–430)
UIBC SERPL-MCNC: 312 UG/DL — SIGNIFICANT CHANGE UP (ref 110–370)
VARIANT LYMPHS # BLD: 0.9 % — SIGNIFICANT CHANGE UP (ref 0–6)
VARIANT LYMPHS NFR BLD MANUAL: 0.9 % — SIGNIFICANT CHANGE UP (ref 0–6)
WBC # BLD: 8.13 K/UL — SIGNIFICANT CHANGE UP (ref 3.8–10.5)
WBC # FLD AUTO: 8.13 K/UL — SIGNIFICANT CHANGE UP (ref 3.8–10.5)

## 2024-10-03 PROCEDURE — 85025 COMPLETE CBC W/AUTO DIFF WBC: CPT

## 2024-10-03 PROCEDURE — 85610 PROTHROMBIN TIME: CPT

## 2024-10-03 PROCEDURE — 85730 THROMBOPLASTIN TIME PARTIAL: CPT

## 2024-10-03 PROCEDURE — 36430 TRANSFUSION BLD/BLD COMPNT: CPT

## 2024-10-03 PROCEDURE — 83550 IRON BINDING TEST: CPT

## 2024-10-03 PROCEDURE — 99285 EMERGENCY DEPT VISIT HI MDM: CPT | Mod: 25

## 2024-10-03 PROCEDURE — 86923 COMPATIBILITY TEST ELECTRIC: CPT

## 2024-10-03 PROCEDURE — 83540 ASSAY OF IRON: CPT

## 2024-10-03 PROCEDURE — 82728 ASSAY OF FERRITIN: CPT

## 2024-10-03 PROCEDURE — 86900 BLOOD TYPING SEROLOGIC ABO: CPT

## 2024-10-03 PROCEDURE — 86901 BLOOD TYPING SEROLOGIC RH(D): CPT

## 2024-10-03 PROCEDURE — 80053 COMPREHEN METABOLIC PANEL: CPT

## 2024-10-03 PROCEDURE — P9016: CPT

## 2024-10-03 PROCEDURE — 86850 RBC ANTIBODY SCREEN: CPT

## 2024-10-03 PROCEDURE — 99284 EMERGENCY DEPT VISIT MOD MDM: CPT

## 2024-10-03 PROCEDURE — 85045 AUTOMATED RETICULOCYTE COUNT: CPT

## 2024-10-03 PROCEDURE — 36000 PLACE NEEDLE IN VEIN: CPT

## 2024-10-04 ENCOUNTER — TRANSCRIPTION ENCOUNTER (OUTPATIENT)
Age: 44
End: 2024-10-04

## 2024-10-07 LAB — IGA 24H UR QL IFE: NORMAL

## 2024-10-09 LAB
ALBUMIN MFR SERPL ELPH: 64.3 %
ALBUMIN SERPL-MCNC: 3.4 G/DL
ALBUMIN/GLOB SERPL: 1.8 RATIO
ALPHA1 GLOB MFR SERPL ELPH: 6.3 %
ALPHA1 GLOB SERPL ELPH-MCNC: 0.3 G/DL
ALPHA2 GLOB MFR SERPL ELPH: 11.7 %
ALPHA2 GLOB SERPL ELPH-MCNC: 0.6 G/DL
B-GLOBULIN MFR SERPL ELPH: 10.9 %
B-GLOBULIN SERPL ELPH-MCNC: 0.6 G/DL
DEPRECATED KAPPA LC FREE/LAMBDA SER: 0.85 RATIO
GAMMA GLOB FLD ELPH-MCNC: 0.4 G/DL
GAMMA GLOB MFR SERPL ELPH: 6.8 %
IGA SER QL IEP: 89 MG/DL
IGG SER QL IEP: 311 MG/DL
IGM SER QL IEP: 93 MG/DL
INTERPRETATION SERPL IEP-IMP: NORMAL
KAPPA LC CSF-MCNC: 1.22 MG/DL
KAPPA LC SERPL-MCNC: 1.04 MG/DL
M PROTEIN MFR SERPL ELPH: NORMAL
M PROTEIN SPEC IFE-MCNC: NORMAL
MONOCLON BAND OBS SERPL: NORMAL
PROT SERPL-MCNC: 5.3 G/DL
PROT SERPL-MCNC: 5.3 G/DL

## 2024-10-21 NOTE — PROGRESS NOTE ADULT - SUBJECTIVE AND OBJECTIVE BOX
RAFITA DOBBS            MRN-[id number]         No Known Allergies                 HPI:  Pt is a 41 yr old female scheduled for Robotic Assisted B/L Total Thymectomy with Dr Callahan tentatively 5/25/21 - pt noted diplopia and ptosis 12/20 and Dx Myasthenia Gravis and found to have mediastinal mass - now for removal. Pt reports improvement with vision - on prednisone and denies weakness or SOB     Pt had Pfizer COVID vaccine   Pt to have COVID preop test   Pt denies COVID  (07 May 2021 08:55)      Procedure: Robotic LVATS Radical thymectomy 5/25/2021        Issues:              Myasthenia               Postop pain  Chest tube in place  Hx of smoking                 Home Medications:  Mestinon 60 mg oral tablet: 1 tab(s) orally 3 times a day (25 May 2021 07:22)  predniSONE 5 mg oral tablet: 1 tab(s) orally TID a day (25 May 2021 07:22)      PAST MEDICAL & SURGICAL HISTORY:  Smoking history    Myasthenia gravis    Disorder of knee  left knee - surgeries x3 with 2 screws    Mediastinal mass    H/O knee surgery  left - x3 with screws    History of tonsillectomy    H/O bilateral breast reduction surgery      ICU Vital Signs Last 24 Hrs  T(C): 37 (25 May 2021 12:00), Max: 37.1 (25 May 2021 06:56)  T(F): 98.6 (25 May 2021 12:00), Max: 98.7 (25 May 2021 06:56)  HR: 69 (25 May 2021 12:15) (69 - 105)  BP: 152/91 (25 May 2021 12:15) (137/92 - 154/96)  BP(mean): 109 (25 May 2021 12:15) (109 - 112)  ABP: --  ABP(mean): --  RR: 14 (25 May 2021 12:15) (13 - 16)  SpO2: 98% (25 May 2021 12:15) (98% - 100%)    I&O's Detail    25 May 2021 07:01  -  25 May 2021 12:33  --------------------------------------------------------  IN:    Lactated Ringers: 60 mL  Total IN: 60 mL    OUT:  Total OUT: 0 mL    Total NET: 60 mL      CAPILLARY BLOOD GLUCOSE      Home Medications:  Mestinon 60 mg oral tablet: 1 tab(s) orally 3 times a day (25 May 2021 07:22)  predniSONE 5 mg oral tablet: 1 tab(s) orally TID a day (25 May 2021 07:22)      MEDICATIONS  (STANDING):  famotidine    Tablet 20 milliGRAM(s) Oral two times a day  heparin   Injectable 5000 Unit(s) SubCutaneous every 8 hours  HYDROmorphone PCA (1 mG/mL) 30 milliLiter(s) PCA Continuous PCA Continuous  lactated ringers. 1000 milliLiter(s) (30 mL/Hr) IV Continuous <Continuous>  predniSONE   Tablet 5 milliGRAM(s) Oral three times a day  pyridostigmine 60 milliGRAM(s) Oral three times a day    MEDICATIONS  (PRN):  diphenhydrAMINE   Injectable 25 milliGRAM(s) IV Push every 4 hours PRN Pruritus  HYDROmorphone PCA (1 mG/mL) Rescue Clinician Bolus 0.5 milliGRAM(s) IV Push every 15 minutes PRN for Pain Scale GREATER THAN 6  naloxone Injectable 0.1 milliGRAM(s) IV Push every 3 minutes PRN For ANY of the following changes in patient status:  A. RR LESS THAN 10 breaths per minute, B. Oxygen saturation LESS THAN 90%, C. Sedation score of 6  ondansetron Injectable 4 milliGRAM(s) IV Push every 6 hours PRN Nausea        Physical exam:                             General:               Pt is awake, alert, appears to be in pain but not in distress                                                 Neuro:                  Nonfocal                             Cardiovascular:   S1 & S2, regular                           Respiratory:         Air entry is fair and equal on both sides, has bilateral conducted sounds                           GI:                          Soft, nondistended and nontender, Bowel sounds active                            Ext:                        No cyanosis or edema     Labs:                                                                 CXR:      Plan:    General: 41yFemale s/p Robotic LVATS Radical thymectomy 5/25/2021, experiencing  pain with deep breathing.                             Neuro:                                         Pain control with PCA / Tylenol IV                                        Monitor NIF / Vital capacity Q 6hrs    Continue Mestinon 60mg q6hrs and Prednisone 5mg q8hrs                            Cardiovascular:                                          Hypertensive in the O.R and received Labetalol IVP. Continue hemodynamic monitoring.                              Respiratory:                                         Pt is on 2L  nasal canula, wean off as tolerated                                          Comfortable, not in any distress.                                         Monitor NIF / Vital capacity                                         Encourage incentive spirometry                                          Monitor chest tube output                                         Chest tube to suction                                                                 Continue bronchodilators, pulmonary toilet                            GI                                         On clear liquids, advance to regular diet as tolerated                                         Continue GI prophylaxis with Pepcid / Protonix                                         Continue Zofran / Reglan for nausea - PRN	                                                                 Renal:                                         Continue LR 30cc/hr                                         Monitor I/Os and electrolytes                                                 Hem/ Onc:                                                                                  Monitor chest tube output &  signs of bleeding.                                          Follow CBC in AM                           Infectious disease:                                            No signs of infection. Monitor for fever / leukocytosis.                                          All surgical incision / chest tube  sites look clean                            Endocrine                                             Continue Accu-Checks with coverage    Pt is on SQ Heparin and Venodyne boots for DVT prophylaxis.     Pertinent clinical, laboratory, radiographic, hemodynamic, echocardiographic, respiratory data, microbiologic data and chart were reviewed and analyzed frequently throughout the course of the day and night  Patient seen, examined and plan discussed with CT Surgeon Dr. Callahan  / CTICU team during rounds.    Pt's status discussed with family at bedside, updated status            Damon Cali MD                                                                    
ANESTHESIA POSTOP CHECK    41y Female POSTOP DAY 1 S/P robotic assisted thymectomy     Vital Signs Last 24 Hrs  T(C): 36.4 (26 May 2021 08:00), Max: 37.4 (25 May 2021 20:00)  T(F): 97.5 (26 May 2021 08:00), Max: 99.3 (25 May 2021 20:00)  HR: 71 (26 May 2021 10:00) (60 - 76)  BP: 124/79 (26 May 2021 10:00) (94/58 - 154/96)  BP(mean): 91 (26 May 2021 10:00) (69 - 112)  RR: 17 (26 May 2021 10:00) (12 - 19)  SpO2: 97% (26 May 2021 10:00) (97% - 100%)  I&O's Summary    25 May 2021 07:01  -  26 May 2021 07:00  --------------------------------------------------------  IN: 600 mL / OUT: 1950 mL / NET: -1350 mL    26 May 2021 07:01  -  26 May 2021 11:22  --------------------------------------------------------  IN: 120 mL / OUT: 630 mL / NET: -510 mL        [X ] NO APPARENT ANESTHESIA COMPLICATIONS      Comments: 
Anesthesia Pain Management Service    SUBJECTIVE: Patient states her pain is managed well with IV PCA.  Pain Scale Score	At rest: _4/10__ 	With Activity: ___ 	[X ] Refer to charted pain scores    THERAPY:    [ ] IV PCA Morphine		[ ] 5 mg/mL	[ ] 1 mg/mL  [X ] IV PCA Hydromorphone	[ ] 5 mg/mL	[X ] 1 mg/mL  [ ] IV PCA Fentanyl		[ ] 50 micrograms/mL    Demand dose __0.2_ lockout __6_ (minutes) Continuous Rate _0__ Total: _1.6__   mg used (in past 24 hrs)      MEDICATIONS  (STANDING):  acetaminophen   Tablet .. 650 milliGRAM(s) Oral every 6 hours  famotidine    Tablet 20 milliGRAM(s) Oral two times a day  heparin   Injectable 5000 Unit(s) SubCutaneous every 8 hours  lactated ringers. 1000 milliLiter(s) (30 mL/Hr) IV Continuous <Continuous>  predniSONE   Tablet 5 milliGRAM(s) Oral three times a day  pyridostigmine 60 milliGRAM(s) Oral three times a day    MEDICATIONS  (PRN):  naloxone Injectable 0.1 milliGRAM(s) IV Push every 3 minutes PRN For ANY of the following changes in patient status:  A. RR LESS THAN 10 breaths per minute, B. Oxygen saturation LESS THAN 90%, C. Sedation score of 6  ondansetron Injectable 4 milliGRAM(s) IV Push every 6 hours PRN Nausea  oxyCODONE    IR 5 milliGRAM(s) Oral every 3 hours PRN Moderate Pain (4 - 6)  oxyCODONE    IR 10 milliGRAM(s) Oral every 3 hours PRN Severe Pain (7 - 10)      OBJECTIVE: Patient sitting up in bed.    Sedation Score:	[ X] Alert	[ ] Drowsy 	[ ] Arousable	[ ] Asleep	[ ] Unresponsive    Side Effects:	[X ] None	[ ] Nausea	[ ] Vomiting	[ ] Pruritus  		[ ] Other:    Vital Signs Last 24 Hrs  T(C): 36.4 (26 May 2021 08:00), Max: 37.4 (25 May 2021 20:00)  T(F): 97.5 (26 May 2021 08:00), Max: 99.3 (25 May 2021 20:00)  HR: 71 (26 May 2021 10:00) (60 - 76)  BP: 124/79 (26 May 2021 10:00) (94/58 - 154/96)  BP(mean): 91 (26 May 2021 10:00) (69 - 112)  RR: 17 (26 May 2021 10:00) (12 - 19)  SpO2: 97% (26 May 2021 10:00) (97% - 100%)    ASSESSMENT/ PLAN    Therapy to  be:	[ ] Continue   [ X] Discontinued   [X ] Change to prn Analgesics    Documentation and Verification of current medications:   [X] Done	[ ] Not done, not elligible    Comments: Discussed patient with primary team. Discontinued IV PCA. PRN Oral/IV opioids and/or Adjuvant non-opioid medication to be ordered at this point.    Progress Note written now but Patient was seen earlier.
daily 1 tab daily   Yes Cynthia Watts MD   clopidogrel (PLAVIX) 75 MG tablet Take 1 tablet by mouth daily 7/26/24  Yes Aristeo Pulliam MD   aspirin 81 MG chewable tablet Take 1 tablet by mouth daily   Yes Cynthia Watts MD   lisinopril (PRINIVIL;ZESTRIL) 10 MG tablet Take 0.5 tablets by mouth daily   Yes Cynthia Watts MD   atorvastatin (LIPITOR) 80 MG tablet Take 1 tablet by mouth daily   Yes Cynthia Watts MD   acetaminophen (TYLENOL) 325 MG tablet Take 2 tablets by mouth every 6 hours as needed for Pain    Cynthia Watts MD   oxyCODONE (ROXICODONE) 5 MG immediate release tablet Take 1 tablet by mouth every 6 hours as needed for Pain. Max Daily Amount: 20 mg    Cynthia Watts MD   polyethylene glycol (GLYCOLAX) 17 g packet Take 1 packet by mouth daily as needed for Constipation    Cynthia Watts MD   senna (SENOKOT) 8.6 MG tablet Take 2 tablets by mouth daily    Cynthia Watts MD   tamsulosin (FLOMAX) 0.4 MG capsule Take 1 capsule by mouth daily    Cynthia Watts MD   sildenafil (VIAGRA) 100 MG tablet Take 1 tablet by mouth as needed for Erectile Dysfunction  Patient not taking: Reported on 10/21/2024    Cynthia Watts MD   Scheduled Meds:  Continuous Infusions:  PRN Meds:.  Past Medical History   has a past medical history of Adrenal mass (HCC), CVA (cerebral vascular accident) (HCC), Diabetes mellitus (HCC), Esophageal reflux, HLD (hyperlipidemia), and Hypertension.  Past Surgical History   has a past surgical history that includes Carotid endarterectomy (01/04/2021); Upper gastrointestinal endoscopy (N/A, 03/28/2024); Esophagogastroduodenoscopy (03/28/2024); and Cerebral angiogram (06/20/2024).  Social History   reports that he quit smoking about 37 years ago. His smoking use included cigarettes. He started smoking about 49 years ago. He has a 12 pack-year smoking history. He has never used smokeless tobacco.   reports current alcohol 
Anesthesia Pain Management Service    SUBJECTIVE: Patient states her pain is managed well with IV PCA.  Pain Scale Score	At rest: _4/10__ 	With Activity: ___ 	[X ] Refer to charted pain scores    THERAPY:    [ ] IV PCA Morphine		[ ] 5 mg/mL	[ ] 1 mg/mL  [X ] IV PCA Hydromorphone	[ ] 5 mg/mL	[X ] 1 mg/mL  [ ] IV PCA Fentanyl		[ ] 50 micrograms/mL    Demand dose __0.2_ lockout __6_ (minutes) Continuous Rate _0__ Total: _1.6__   mg used (in past 24 hrs)      MEDICATIONS  (STANDING):  acetaminophen   Tablet .. 650 milliGRAM(s) Oral every 6 hours  famotidine    Tablet 20 milliGRAM(s) Oral two times a day  heparin   Injectable 5000 Unit(s) SubCutaneous every 8 hours  lactated ringers. 1000 milliLiter(s) (30 mL/Hr) IV Continuous <Continuous>  predniSONE   Tablet 5 milliGRAM(s) Oral three times a day  pyridostigmine 60 milliGRAM(s) Oral three times a day    MEDICATIONS  (PRN):  naloxone Injectable 0.1 milliGRAM(s) IV Push every 3 minutes PRN For ANY of the following changes in patient status:  A. RR LESS THAN 10 breaths per minute, B. Oxygen saturation LESS THAN 90%, C. Sedation score of 6  ondansetron Injectable 4 milliGRAM(s) IV Push every 6 hours PRN Nausea  oxyCODONE    IR 5 milliGRAM(s) Oral every 3 hours PRN Moderate Pain (4 - 6)  oxyCODONE    IR 10 milliGRAM(s) Oral every 3 hours PRN Severe Pain (7 - 10)      OBJECTIVE: Patient sitting up in bed.    Sedation Score:	[ X] Alert	[ ] Drowsy 	[ ] Arousable	[ ] Asleep	[ ] Unresponsive    Side Effects:	[X ] None	[ ] Nausea	[ ] Vomiting	[ ] Pruritus  		[ ] Other:    Vital Signs Last 24 Hrs  T(C): 36.4 (26 May 2021 08:00), Max: 37.4 (25 May 2021 20:00)  T(F): 97.5 (26 May 2021 08:00), Max: 99.3 (25 May 2021 20:00)  HR: 71 (26 May 2021 10:00) (60 - 76)  BP: 124/79 (26 May 2021 10:00) (94/58 - 154/96)  BP(mean): 91 (26 May 2021 10:00) (69 - 112)  RR: 17 (26 May 2021 10:00) (12 - 19)  SpO2: 97% (26 May 2021 10:00) (97% - 100%)    ASSESSMENT/ PLAN    Therapy to  be:	[ ] Continue   [ X] Discontinued   [X ] Change to prn Analgesics    Documentation and Verification of current medications:   [X] Done	[ ] Not done, not elligible    Comments: Discussed patient with primary team. Discontinued IV PCA. PRN Oral/IV opioids and/or Adjuvant non-opioid medication to be ordered at this point.    Progress Note written now but Patient was seen earlier.  
RAFITA DOBBS                     [id number]    HPI:  Pt is a 41 yr old female scheduled for Robotic Assisted B/L Total Thymectomy with Dr Callahan tentatively 5/25/21 - pt noted diplopia and ptosis 12/20 and Dx Myasthenia Gravis and found to have mediastinal mass - now for removal. Pt reports improvement with vision - on prednisone and denies weakness or SOB     Pt had Pfizer COVID vaccine   Pt to have COVID preop test   Pt denies COVID  (07 May 2021 08:55)      Procedure: Robotic LVATS Radical thymectomy 5/25/2021    Issues:              Myasthenia               Postop pain  Chest tube in place  Hx of smoking  Home Medications:  Mestinon 60 mg oral tablet: 1 tab(s) orally 3 times a day (25 May 2021 07:22)  predniSONE 5 mg oral tablet: 1 tab(s) orally TID a day (25 May 2021 07:22)    PAST MEDICAL & SURGICAL HISTORY:  Smoking history    Myasthenia gravis    Disorder of knee  left knee - surgeries x3 with 2 screws    Mediastinal mass    H/O knee surgery  left - x3 with screws    History of tonsillectomy    H/O bilateral breast reduction surgery              VITAL SIGNS:  Vital Signs Last 24 Hrs  T(C): 36.4 (26 May 2021 08:00), Max: 37.4 (25 May 2021 20:00)  T(F): 97.5 (26 May 2021 08:00), Max: 99.3 (25 May 2021 20:00)  HR: 77 (26 May 2021 12:00) (60 - 77)  BP: 114/75 (26 May 2021 12:00) (94/58 - 129/74)  BP(mean): 86 (26 May 2021 12:00) (69 - 96)  RR: 18 (26 May 2021 12:00) (12 - 19)  SpO2: 97% (26 May 2021 12:00) (97% - 100%)    I/Os:   I&O's Detail    25 May 2021 07:01  -  26 May 2021 07:00  --------------------------------------------------------  IN:    Lactated Ringers: 600 mL  Total IN: 600 mL    OUT:    Chest Tube (mL): 200 mL    Voided (mL): 1750 mL  Total OUT: 1950 mL    Total NET: -1350 mL      26 May 2021 07:01  -  26 May 2021 12:57  --------------------------------------------------------  IN:    Lactated Ringers: 120 mL  Total IN: 120 mL    OUT:    Chest Tube (mL): 30 mL    Voided (mL): 600 mL  Total OUT: 630 mL    Total NET: -510 mL          CAPILLARY BLOOD GLUCOSE          =======================MEDICATIONS===================  MEDICATIONS  (STANDING):  acetaminophen   Tablet .. 650 milliGRAM(s) Oral every 6 hours  famotidine    Tablet 20 milliGRAM(s) Oral two times a day  heparin   Injectable 5000 Unit(s) SubCutaneous every 8 hours  lactated ringers. 1000 milliLiter(s) (30 mL/Hr) IV Continuous <Continuous>  predniSONE   Tablet 5 milliGRAM(s) Oral three times a day  pyridostigmine 60 milliGRAM(s) Oral three times a day    MEDICATIONS  (PRN):  ondansetron Injectable 4 milliGRAM(s) IV Push every 6 hours PRN Nausea  oxyCODONE    IR 5 milliGRAM(s) Oral every 3 hours PRN Moderate Pain (4 - 6)  oxyCODONE    IR 10 milliGRAM(s) Oral every 3 hours PRN Severe Pain (7 - 10)    PHYSICAL EXAM============================  General:                         Awake, alert, not in any distress  Neuro:                            Moving all extremities to commands.   Respiratory:	Air entry fair and  bilateral conducted sounds                                           Effort even and unlabored.  CV:		Regular rate and rhythm. Normal S1/S2                                          Distal pulses present.  Abdomen:	                     Soft, non-distended. Bowel sounds present   Skin:		No rash.  Extremities:	Warm, no cyanosis or edema.  Palpable pulses    ============================LABS=========================    05-26    139  |  105  |  12  ----------------------------<  126<H>  3.7   |  22  |  0.67    Ca    8.7      26 May 2021 04:41  Phos  3.0     05-26  Mg     2.1     05-26      A/P:   41yFemale s/p Robotic LVATS Radical thymectomy 5/25/2021, experiencing  pain with deep breathing.                             Neuro:                                         Pain control with PCA / Tylenol IV                                        Monitor NIF / Vital capacity Q 6hrs    Continue Mestinon 60mg q6hrs and Prednisone 5mg q8hrs                            Cardiovascular:  Cont Tele monitoring                                         Hypertensive in the O.R and received Labetalol IVP. Continue hemodynamic monitoring.                              Respiratory:                                         Pt is on 2L  nasal canula, wean off as tolerated                                          Comfortable, not in any distress.                                         Monitor NIF / Vital capacity                                         Encourage incentive spirometry                                          Monitor chest tube output                                         Chest tube to suction                                                                 Continue bronchodilators, pulmonary toilet                            GI                                         On regular diet as tolerated                                         Continue GI prophylaxis with Pepcid                                          Continue Zofran / Reglan for nausea - PRN	                                                                 Renal:                                         Continue LR                                          Monitor I/Os and electrolytes                                                 Hem/ Onc:                                                                                  Monitor chest tube output &  signs of bleeding.                                          Follow CBC in AM                           Infectious disease:                                            No signs of infection. Monitor for fever / leukocytosis.                                          All surgical incision / chest tube  sites look clean                            Endocrine                                             Continue Accu-Checks with coverage    Pt is on SQ Heparin and Venodyne boots for DVT prophylaxis.     Pertinent clinical, laboratory, radiographic, hemodynamic, echocardiographic, respiratory data, microbiologic data and chart were reviewed and analyzed frequently throughout the course of the day and night  Patient seen, examined and plan discussed with CT Surgeon Dr. Callahan  / CTICU team during rounds.    Pt's status discussed with family at bedside, updated status            Rachel French DO, FACEP

## 2024-12-14 ENCOUNTER — TRANSCRIPTION ENCOUNTER (OUTPATIENT)
Age: 44
End: 2024-12-14

## 2024-12-14 RX ORDER — PYRIDOSTIGMINE BROMIDE 180 MG/1
180 TABLET ORAL
Qty: 90 | Refills: 3 | Status: ACTIVE | COMMUNITY
Start: 2024-12-14 | End: 1900-01-01

## 2024-12-16 ENCOUNTER — TRANSCRIPTION ENCOUNTER (OUTPATIENT)
Age: 44
End: 2024-12-16

## 2024-12-16 RX ORDER — PYRIDOSTIGMINE BROMIDE 180 MG/1
180 TABLET ORAL
Qty: 180 | Refills: 3 | Status: ACTIVE | COMMUNITY
Start: 2024-12-16 | End: 1900-01-01

## 2025-02-10 ENCOUNTER — APPOINTMENT (OUTPATIENT)
Dept: NEUROLOGY | Facility: CLINIC | Age: 45
End: 2025-02-10
Payer: COMMERCIAL

## 2025-02-10 VITALS
HEART RATE: 79 BPM | BODY MASS INDEX: 29.99 KG/M2 | SYSTOLIC BLOOD PRESSURE: 128 MMHG | WEIGHT: 180 LBS | DIASTOLIC BLOOD PRESSURE: 68 MMHG | HEIGHT: 65 IN

## 2025-02-10 DIAGNOSIS — G70.00 MYASTHENIA GRAVIS W/OUT (ACUTE) EXACERBATION: ICD-10-CM

## 2025-02-10 PROCEDURE — 99213 OFFICE O/P EST LOW 20 MIN: CPT

## 2025-02-11 ENCOUNTER — APPOINTMENT (OUTPATIENT)
Dept: MAMMOGRAPHY | Facility: CLINIC | Age: 45
End: 2025-02-11
Payer: COMMERCIAL

## 2025-02-11 PROCEDURE — 77063 BREAST TOMOSYNTHESIS BI: CPT

## 2025-02-11 PROCEDURE — 77067 SCR MAMMO BI INCL CAD: CPT

## 2025-03-11 ENCOUNTER — OUTPATIENT (OUTPATIENT)
Dept: OUTPATIENT SERVICES | Facility: HOSPITAL | Age: 45
LOS: 1 days | End: 2025-03-11
Payer: COMMERCIAL

## 2025-03-11 DIAGNOSIS — Z90.89 ACQUIRED ABSENCE OF OTHER ORGANS: Chronic | ICD-10-CM

## 2025-03-11 DIAGNOSIS — Z98.890 OTHER SPECIFIED POSTPROCEDURAL STATES: Chronic | ICD-10-CM

## 2025-03-11 DIAGNOSIS — D64.9 ANEMIA, UNSPECIFIED: ICD-10-CM

## 2025-03-14 ENCOUNTER — RESULT REVIEW (OUTPATIENT)
Age: 45
End: 2025-03-14

## 2025-03-14 ENCOUNTER — APPOINTMENT (OUTPATIENT)
Dept: HEMATOLOGY ONCOLOGY | Facility: CLINIC | Age: 45
End: 2025-03-14
Payer: COMMERCIAL

## 2025-03-14 ENCOUNTER — NON-APPOINTMENT (OUTPATIENT)
Age: 45
End: 2025-03-14

## 2025-03-14 VITALS
HEIGHT: 65 IN | WEIGHT: 174 LBS | OXYGEN SATURATION: 99 % | TEMPERATURE: 98.4 F | HEART RATE: 73 BPM | SYSTOLIC BLOOD PRESSURE: 125 MMHG | BODY MASS INDEX: 28.99 KG/M2 | DIASTOLIC BLOOD PRESSURE: 77 MMHG

## 2025-03-14 DIAGNOSIS — D47.2 MONOCLONAL GAMMOPATHY: ICD-10-CM

## 2025-03-14 DIAGNOSIS — D64.9 ANEMIA, UNSPECIFIED: ICD-10-CM

## 2025-03-14 LAB
BASOPHILS # BLD AUTO: 0.02 K/UL — SIGNIFICANT CHANGE UP (ref 0–0.2)
BASOPHILS NFR BLD AUTO: 0.3 % — SIGNIFICANT CHANGE UP (ref 0–2)
EOSINOPHIL # BLD AUTO: 0.04 K/UL — SIGNIFICANT CHANGE UP (ref 0–0.5)
EOSINOPHIL NFR BLD AUTO: 0.6 % — SIGNIFICANT CHANGE UP (ref 0–6)
HCT VFR BLD CALC: 31.8 % — LOW (ref 34.5–45)
HGB BLD-MCNC: 9 G/DL — LOW (ref 11.5–15.5)
IMM GRANULOCYTES NFR BLD AUTO: 0.5 % — SIGNIFICANT CHANGE UP (ref 0–0.9)
LYMPHOCYTES # BLD AUTO: 0.75 K/UL — LOW (ref 1–3.3)
LYMPHOCYTES # BLD AUTO: 12.1 % — LOW (ref 13–44)
MCHC RBC-ENTMCNC: 24.7 PG — LOW (ref 27–34)
MCHC RBC-ENTMCNC: 28.3 G/DL — LOW (ref 32–36)
MCV RBC AUTO: 87.1 FL — SIGNIFICANT CHANGE UP (ref 80–100)
MONOCYTES # BLD AUTO: 0.63 K/UL — SIGNIFICANT CHANGE UP (ref 0–0.9)
MONOCYTES NFR BLD AUTO: 10.1 % — SIGNIFICANT CHANGE UP (ref 2–14)
NEUTROPHILS # BLD AUTO: 4.74 K/UL — SIGNIFICANT CHANGE UP (ref 1.8–7.4)
NEUTROPHILS NFR BLD AUTO: 76.4 % — SIGNIFICANT CHANGE UP (ref 43–77)
NRBC BLD AUTO-RTO: 0 /100 WBCS — SIGNIFICANT CHANGE UP (ref 0–0)
PLATELET # BLD AUTO: 366 K/UL — SIGNIFICANT CHANGE UP (ref 150–400)
RBC # BLD: 3.65 M/UL — LOW (ref 3.8–5.2)
RBC # FLD: 15.9 % — HIGH (ref 10.3–14.5)
WBC # BLD: 6.21 K/UL — SIGNIFICANT CHANGE UP (ref 3.8–10.5)
WBC # FLD AUTO: 6.21 K/UL — SIGNIFICANT CHANGE UP (ref 3.8–10.5)

## 2025-03-14 PROCEDURE — 85027 COMPLETE CBC AUTOMATED: CPT

## 2025-03-14 PROCEDURE — 99204 OFFICE O/P NEW MOD 45 MIN: CPT

## 2025-03-14 PROCEDURE — G2211 COMPLEX E/M VISIT ADD ON: CPT | Mod: NC

## 2025-03-16 LAB
FERRITIN SERPL-MCNC: 18 NG/ML
FOLATE SERPL-MCNC: 8.7 NG/ML
HCG SERPL-MCNC: <1 MIU/ML
IRON SATN MFR SERPL: 4 %
IRON SERPL-MCNC: 14 UG/DL
TIBC SERPL-MCNC: 346 UG/DL
TRANSFERRIN SERPL-MCNC: 292 MG/DL
UIBC SERPL-MCNC: 332 UG/DL
VIT B12 SERPL-MCNC: >2000 PG/ML

## 2025-04-16 ENCOUNTER — TRANSCRIPTION ENCOUNTER (OUTPATIENT)
Age: 45
End: 2025-04-16

## 2025-04-16 RX ORDER — PREDNISONE 5 MG/1
5 TABLET ORAL DAILY
Qty: 360 | Refills: 3 | Status: ACTIVE | COMMUNITY
Start: 2025-04-16 | End: 1900-01-01

## 2025-04-21 ENCOUNTER — TRANSCRIPTION ENCOUNTER (OUTPATIENT)
Age: 45
End: 2025-04-21

## 2025-04-22 ENCOUNTER — TRANSCRIPTION ENCOUNTER (OUTPATIENT)
Age: 45
End: 2025-04-22

## 2025-04-30 ENCOUNTER — APPOINTMENT (OUTPATIENT)
Dept: MRI IMAGING | Facility: CLINIC | Age: 45
End: 2025-04-30
Payer: COMMERCIAL

## 2025-04-30 PROCEDURE — A9585: CPT | Mod: JW

## 2025-04-30 PROCEDURE — 77049 MRI BREAST C-+ W/CAD BI: CPT

## 2025-06-10 ENCOUNTER — TRANSCRIPTION ENCOUNTER (OUTPATIENT)
Age: 45
End: 2025-06-10

## 2025-06-24 ENCOUNTER — TRANSCRIPTION ENCOUNTER (OUTPATIENT)
Age: 45
End: 2025-06-24

## 2025-06-24 RX ORDER — PYRIDOSTIGMINE BROMIDE 60 MG/1
60 TABLET ORAL
Qty: 405 | Refills: 3 | Status: ACTIVE | COMMUNITY
Start: 2025-06-24 | End: 1900-01-01

## 2025-06-24 RX ORDER — SULFAMETHOXAZOLE AND TRIMETHOPRIM 800; 160 MG/1; MG/1
800-160 TABLET ORAL DAILY
Qty: 36 | Refills: 3 | Status: ACTIVE | COMMUNITY
Start: 2025-06-24 | End: 1900-01-01

## 2025-07-14 ENCOUNTER — APPOINTMENT (OUTPATIENT)
Dept: HEMATOLOGY ONCOLOGY | Facility: CLINIC | Age: 45
End: 2025-07-14

## 2025-07-16 RX ORDER — RAVULIZUMAB 1100 MG/11ML
1100 SOLUTION, CONCENTRATE INTRAVENOUS
Qty: 3 | Refills: 5 | Status: ACTIVE | COMMUNITY
Start: 2025-07-16 | End: 1900-01-01

## 2025-08-18 ENCOUNTER — TRANSCRIPTION ENCOUNTER (OUTPATIENT)
Age: 45
End: 2025-08-18

## 2025-08-19 ENCOUNTER — NON-APPOINTMENT (OUTPATIENT)
Age: 45
End: 2025-08-19

## 2025-08-19 ENCOUNTER — TRANSCRIPTION ENCOUNTER (OUTPATIENT)
Age: 45
End: 2025-08-19

## 2025-08-20 ENCOUNTER — TRANSCRIPTION ENCOUNTER (OUTPATIENT)
Age: 45
End: 2025-08-20

## 2025-08-22 ENCOUNTER — NON-APPOINTMENT (OUTPATIENT)
Age: 45
End: 2025-08-22

## 2025-08-22 ENCOUNTER — LABORATORY RESULT (OUTPATIENT)
Age: 45
End: 2025-08-22

## 2025-08-22 ENCOUNTER — APPOINTMENT (OUTPATIENT)
Dept: NEUROLOGY | Facility: CLINIC | Age: 45
End: 2025-08-22
Payer: COMMERCIAL

## 2025-08-22 VITALS
HEIGHT: 65 IN | SYSTOLIC BLOOD PRESSURE: 132 MMHG | BODY MASS INDEX: 30.49 KG/M2 | DIASTOLIC BLOOD PRESSURE: 85 MMHG | HEART RATE: 60 BPM | WEIGHT: 183 LBS

## 2025-08-22 DIAGNOSIS — D64.9 ANEMIA, UNSPECIFIED: ICD-10-CM

## 2025-08-22 DIAGNOSIS — D47.2 MONOCLONAL GAMMOPATHY: ICD-10-CM

## 2025-08-22 DIAGNOSIS — G70.00 MYASTHENIA GRAVIS W/OUT (ACUTE) EXACERBATION: ICD-10-CM

## 2025-08-22 DIAGNOSIS — G56.22 LESION OF ULNAR NERVE, LEFT UPPER LIMB: ICD-10-CM

## 2025-08-22 DIAGNOSIS — G51.0 BELL'S PALSY: ICD-10-CM

## 2025-08-22 PROCEDURE — 99214 OFFICE O/P EST MOD 30 MIN: CPT

## 2025-08-22 RX ORDER — VALACYCLOVIR 1 G/1
1 TABLET, FILM COATED ORAL 3 TIMES DAILY
Qty: 21 | Refills: 0 | Status: ACTIVE | COMMUNITY
Start: 2025-08-22 | End: 1900-01-01

## 2025-08-23 ENCOUNTER — NON-APPOINTMENT (OUTPATIENT)
Age: 45
End: 2025-08-23

## 2025-08-24 LAB
ALBUMIN SERPL ELPH-MCNC: 4.5 G/DL
ALP BLD-CCNC: 75 U/L
ALT SERPL-CCNC: 20 U/L
ANION GAP SERPL CALC-SCNC: 15 MMOL/L
AST SERPL-CCNC: 19 U/L
BASOPHILS # BLD AUTO: 0.01 K/UL
BASOPHILS NFR BLD AUTO: 0.1 %
BILIRUB SERPL-MCNC: 0.2 MG/DL
BUN SERPL-MCNC: 14 MG/DL
CALCIUM SERPL-MCNC: 9.9 MG/DL
CHLORIDE SERPL-SCNC: 105 MMOL/L
CO2 SERPL-SCNC: 22 MMOL/L
CREAT SERPL-MCNC: 0.65 MG/DL
EGFRCR SERPLBLD CKD-EPI 2021: 109 ML/MIN/1.73M2
EOSINOPHIL # BLD AUTO: 0.04 K/UL
EOSINOPHIL NFR BLD AUTO: 0.6 %
FERRITIN SERPL-MCNC: 7 NG/ML
GLUCOSE SERPL-MCNC: 89 MG/DL
HBV CORE IGG+IGM SER QL: NONREACTIVE
HBV SURFACE AB SER QL: REACTIVE
HBV SURFACE AG SER QL: NONREACTIVE
HCT VFR BLD CALC: 37.4 %
HCV AB SER QL: NONREACTIVE
HCV S/CO RATIO: 0.22 S/CO
HGB BLD-MCNC: 10.2 G/DL
IMM GRANULOCYTES NFR BLD AUTO: 0.6 %
IRON SATN MFR SERPL: 5 %
IRON SERPL-MCNC: 21 UG/DL
LYMPHOCYTES # BLD AUTO: 1.02 K/UL
LYMPHOCYTES NFR BLD AUTO: 14.1 %
MAN DIFF?: NORMAL
MCHC RBC-ENTMCNC: 19.8 PG
MCHC RBC-ENTMCNC: 27.3 G/DL
MCV RBC AUTO: 72.6 FL
MONOCYTES # BLD AUTO: 0.53 K/UL
MONOCYTES NFR BLD AUTO: 7.3 %
NEUTROPHILS # BLD AUTO: 5.58 K/UL
NEUTROPHILS NFR BLD AUTO: 77.3 %
PLATELET # BLD AUTO: 345 K/UL
POTASSIUM SERPL-SCNC: 4.1 MMOL/L
PROT SERPL-MCNC: 7.4 G/DL
RBC # BLD: 5.15 M/UL
RBC # FLD: 19.4 %
SODIUM SERPL-SCNC: 142 MMOL/L
TIBC SERPL-MCNC: 428 UG/DL
UIBC SERPL-MCNC: 407 UG/DL
WBC # FLD AUTO: 7.22 K/UL

## 2025-08-25 LAB — STRIA MUS AB SER QL: NORMAL

## 2025-08-26 LAB
A PHAGOCYTOPH IGG TITR SER IF: NORMAL
ACRM BINDING ANTIBODY: >80 NMOL/L
B BURGDOR AB SER QL IA: 0.31 IV
B MICROTI IGG TITR SER: NORMAL
E CHAFFEENSIS IGG TITR SER IF: NORMAL
IGA 24H UR QL IFE: NORMAL

## 2025-08-28 LAB
ACHR BLOCK AB SER QL: 68 %
ACHR MOD AB SER-ACNC: 93 %
ALBUMIN MFR SERPL ELPH: 59.9 %
ALBUMIN SERPL-MCNC: 4.4 G/DL
ALBUMIN/GLOB SERPL: 1.5 RATIO
ALPHA1 GLOB MFR SERPL ELPH: 4.4 %
ALPHA1 GLOB SERPL ELPH-MCNC: 0.3 G/DL
ALPHA2 GLOB MFR SERPL ELPH: 12.9 %
ALPHA2 GLOB SERPL ELPH-MCNC: 1 G/DL
B-GLOBULIN MFR SERPL ELPH: 12.3 %
B-GLOBULIN SERPL ELPH-MCNC: 0.9 G/DL
DEPRECATED KAPPA LC FREE/LAMBDA SER: 0.9 RATIO
GAMMA GLOB FLD ELPH-MCNC: 0.8 G/DL
GAMMA GLOB MFR SERPL ELPH: 10.5 %
IGA SERPL-MCNC: 150 MG/DL
IGG SERPL-MCNC: 680 MG/DL
IGM SERPL-MCNC: 191 MG/DL
INTERPRETATION SERPL IEP-IMP: NORMAL
KAPPA LC CSF-MCNC: 1.34 MG/DL
KAPPA LC SERPL-MCNC: 1.21 MG/DL
M PROTEIN MFR SERPL ELPH: NORMAL
M PROTEIN SPEC IFE-MCNC: NORMAL
MONOCLON BAND OBS SERPL: NORMAL
PROT SERPL-MCNC: 7.4 G/DL
PROT SERPL-MCNC: 7.4 G/DL

## 2025-09-03 ENCOUNTER — TRANSCRIPTION ENCOUNTER (OUTPATIENT)
Age: 45
End: 2025-09-03

## 2025-09-09 ENCOUNTER — APPOINTMENT (OUTPATIENT)
Dept: MRI IMAGING | Facility: HOSPITAL | Age: 45
End: 2025-09-09

## 2025-09-19 ENCOUNTER — APPOINTMENT (OUTPATIENT)
Dept: MRI IMAGING | Facility: CLINIC | Age: 45
End: 2025-09-19
Payer: COMMERCIAL

## 2025-09-19 PROCEDURE — 70553 MRI BRAIN STEM W/O & W/DYE: CPT
